# Patient Record
Sex: FEMALE | Race: WHITE | NOT HISPANIC OR LATINO | Employment: OTHER | URBAN - METROPOLITAN AREA
[De-identification: names, ages, dates, MRNs, and addresses within clinical notes are randomized per-mention and may not be internally consistent; named-entity substitution may affect disease eponyms.]

---

## 2017-12-21 ENCOUNTER — OFFICE VISIT - HEALTHEAST (OUTPATIENT)
Dept: FAMILY MEDICINE | Facility: CLINIC | Age: 62
End: 2017-12-21

## 2017-12-21 DIAGNOSIS — H10.9 CONJUNCTIVITIS: ICD-10-CM

## 2019-09-25 ENCOUNTER — COMMUNICATION - HEALTHEAST (OUTPATIENT)
Dept: FAMILY MEDICINE | Facility: CLINIC | Age: 64
End: 2019-09-25

## 2021-05-25 ENCOUNTER — RECORDS - HEALTHEAST (OUTPATIENT)
Dept: ADMINISTRATIVE | Facility: CLINIC | Age: 66
End: 2021-05-25

## 2021-05-26 ENCOUNTER — RECORDS - HEALTHEAST (OUTPATIENT)
Dept: ADMINISTRATIVE | Facility: CLINIC | Age: 66
End: 2021-05-26

## 2021-05-27 ENCOUNTER — RECORDS - HEALTHEAST (OUTPATIENT)
Dept: ADMINISTRATIVE | Facility: CLINIC | Age: 66
End: 2021-05-27

## 2021-05-28 ENCOUNTER — RECORDS - HEALTHEAST (OUTPATIENT)
Dept: ADMINISTRATIVE | Facility: CLINIC | Age: 66
End: 2021-05-28

## 2021-05-29 ENCOUNTER — RECORDS - HEALTHEAST (OUTPATIENT)
Dept: ADMINISTRATIVE | Facility: CLINIC | Age: 66
End: 2021-05-29

## 2021-05-30 ENCOUNTER — RECORDS - HEALTHEAST (OUTPATIENT)
Dept: ADMINISTRATIVE | Facility: CLINIC | Age: 66
End: 2021-05-30

## 2021-05-31 ENCOUNTER — RECORDS - HEALTHEAST (OUTPATIENT)
Dept: ADMINISTRATIVE | Facility: CLINIC | Age: 66
End: 2021-05-31

## 2021-05-31 VITALS — BODY MASS INDEX: 23.59 KG/M2 | WEIGHT: 129 LBS

## 2021-06-05 ENCOUNTER — RECORDS - HEALTHEAST (OUTPATIENT)
Dept: UROLOGY | Facility: CLINIC | Age: 66
End: 2021-06-05

## 2021-06-05 DIAGNOSIS — N20.0 CALCULUS OF KIDNEY: ICD-10-CM

## 2021-06-14 NOTE — PROGRESS NOTES
Assessment:      Acute conjunctivitis, right eye      Plan:      Discussed the diagnosis and proper care of conjunctivitis.  Stressed household hygiene.  Ophthalmic drops per orders.   Discussed signs of worsening infection and when to follow-up with PCP if no symptom improvement.    Patient Instructions   You were seen today for conjunctivitis.    Management:  - Apply antibiotic drops as prescribed until 24 hours of no symptoms  - Use warm compresses to clear discharge and crust  - Encourage good hand hygiene with frequent hand washing  - Avoid itching or rubbing the eye    Reasons to come back:  - If symptoms have not improved in 3-5 days  - Develop excessive pus-like discharge and/or can't keep eyes open  - Develop a fever, cough, ear pain, or shortness of breath         Subjective:      Dena Perales is a 62 y.o. female who presents for evaluation of eye redness. She has noticed the above symptoms in the right eye for 1 day. Onset was sudden. Symptoms have included blurred vision, discharge and itching. Patient denies foreign body sensation, photophobia and visual field deficit. There is no history of contact lens use or trauma.    The following portions of the patient's history were reviewed and updated as appropriate: allergies, current medications and problem list.    Review of Systems  Pertinent items are noted in HPI.     Allergies  No Known Allergies      Objective:      /74  Pulse 64  Temp 97.6  F (36.4  C) (Oral)   Resp 14  Wt 129 lb (58.5 kg)  SpO2 98%  BMI 23.59 kg/m2            General: alert, appears stated age, cooperative, no distress and non-toxic   Eyes:  right: conjunctivae erythematous, sclera injected; no discharge noted. Left: conjunctivae/sclera clear, no discharge. PERRL. EOM intact   Vision: Not performed   Fluorescein:  not done

## 2021-07-13 ENCOUNTER — RECORDS - HEALTHEAST (OUTPATIENT)
Dept: ADMINISTRATIVE | Facility: CLINIC | Age: 66
End: 2021-07-13

## 2021-07-21 ENCOUNTER — RECORDS - HEALTHEAST (OUTPATIENT)
Dept: ADMINISTRATIVE | Facility: CLINIC | Age: 66
End: 2021-07-21

## 2021-09-05 ENCOUNTER — HEALTH MAINTENANCE LETTER (OUTPATIENT)
Age: 66
End: 2021-09-05

## 2021-09-15 ENCOUNTER — APPOINTMENT (OUTPATIENT)
Dept: CT IMAGING | Facility: HOSPITAL | Age: 66
DRG: 394 | End: 2021-09-15
Attending: EMERGENCY MEDICINE
Payer: COMMERCIAL

## 2021-09-15 ENCOUNTER — HOSPITAL ENCOUNTER (INPATIENT)
Facility: HOSPITAL | Age: 66
LOS: 3 days | Discharge: HOME | DRG: 394 | End: 2021-09-19
Attending: EMERGENCY MEDICINE | Admitting: HOSPITALIST
Payer: COMMERCIAL

## 2021-09-15 DIAGNOSIS — K27.9 PEPTIC ULCER: ICD-10-CM

## 2021-09-15 DIAGNOSIS — R10.13 ABDOMINAL PAIN, EPIGASTRIC: ICD-10-CM

## 2021-09-15 DIAGNOSIS — R11.0 NAUSEA: ICD-10-CM

## 2021-09-15 DIAGNOSIS — K91.850 POUCHITIS (H): Primary | ICD-10-CM

## 2021-09-15 DIAGNOSIS — K29.71 GASTROINTESTINAL HEMORRHAGE ASSOCIATED WITH GASTRITIS, UNSPECIFIED GASTRITIS TYPE: ICD-10-CM

## 2021-09-15 LAB
ALBUMIN SERPL-MCNC: 3.6 G/DL (ref 3.5–5)
ALP SERPL-CCNC: 54 U/L (ref 45–120)
ALT SERPL W P-5'-P-CCNC: 14 U/L (ref 0–45)
ANION GAP SERPL CALCULATED.3IONS-SCNC: 13 MMOL/L (ref 5–18)
AST SERPL W P-5'-P-CCNC: 22 U/L (ref 0–40)
BILIRUB SERPL-MCNC: 0.8 MG/DL (ref 0–1)
BUN SERPL-MCNC: 8 MG/DL (ref 8–22)
CALCIUM SERPL-MCNC: 9.9 MG/DL (ref 8.5–10.5)
CHLORIDE BLD-SCNC: 92 MMOL/L (ref 98–107)
CO2 SERPL-SCNC: 31 MMOL/L (ref 22–31)
CREAT SERPL-MCNC: 0.7 MG/DL (ref 0.6–1.1)
ERYTHROCYTE [DISTWIDTH] IN BLOOD BY AUTOMATED COUNT: 13.6 % (ref 10–15)
GFR SERPL CREATININE-BSD FRML MDRD: >90 ML/MIN/1.73M2
GLUCOSE BLD-MCNC: 123 MG/DL (ref 70–125)
HCT VFR BLD AUTO: 42.7 % (ref 35–47)
HGB BLD-MCNC: 13.9 G/DL (ref 11.7–15.7)
LIPASE SERPL-CCNC: 23 U/L (ref 0–52)
MAGNESIUM SERPL-MCNC: 1.9 MG/DL (ref 1.8–2.6)
MCH RBC QN AUTO: 29.3 PG (ref 26.5–33)
MCHC RBC AUTO-ENTMCNC: 32.6 G/DL (ref 31.5–36.5)
MCV RBC AUTO: 90 FL (ref 78–100)
PLATELET # BLD AUTO: 328 10E3/UL (ref 150–450)
POTASSIUM BLD-SCNC: 4.2 MMOL/L (ref 3.5–5)
PROT SERPL-MCNC: 7.1 G/DL (ref 6–8)
RBC # BLD AUTO: 4.74 10E6/UL (ref 3.8–5.2)
SARS-COV-2 RNA RESP QL NAA+PROBE: NEGATIVE
SODIUM SERPL-SCNC: 136 MMOL/L (ref 136–145)
WBC # BLD AUTO: 9.8 10E3/UL (ref 4–11)

## 2021-09-15 PROCEDURE — 250N000011 HC RX IP 250 OP 636: Performed by: EMERGENCY MEDICINE

## 2021-09-15 PROCEDURE — C9113 INJ PANTOPRAZOLE SODIUM, VIA: HCPCS | Performed by: EMERGENCY MEDICINE

## 2021-09-15 PROCEDURE — 96375 TX/PRO/DX INJ NEW DRUG ADDON: CPT

## 2021-09-15 PROCEDURE — 96361 HYDRATE IV INFUSION ADD-ON: CPT

## 2021-09-15 PROCEDURE — 83690 ASSAY OF LIPASE: CPT | Performed by: EMERGENCY MEDICINE

## 2021-09-15 PROCEDURE — 258N000003 HC RX IP 258 OP 636: Performed by: EMERGENCY MEDICINE

## 2021-09-15 PROCEDURE — 83735 ASSAY OF MAGNESIUM: CPT | Performed by: EMERGENCY MEDICINE

## 2021-09-15 PROCEDURE — 87635 SARS-COV-2 COVID-19 AMP PRB: CPT | Performed by: EMERGENCY MEDICINE

## 2021-09-15 PROCEDURE — C9803 HOPD COVID-19 SPEC COLLECT: HCPCS

## 2021-09-15 PROCEDURE — 85027 COMPLETE CBC AUTOMATED: CPT | Performed by: EMERGENCY MEDICINE

## 2021-09-15 PROCEDURE — 74177 CT ABD & PELVIS W/CONTRAST: CPT

## 2021-09-15 PROCEDURE — 96365 THER/PROPH/DIAG IV INF INIT: CPT

## 2021-09-15 PROCEDURE — 99285 EMERGENCY DEPT VISIT HI MDM: CPT | Mod: 25

## 2021-09-15 PROCEDURE — 82247 BILIRUBIN TOTAL: CPT | Performed by: EMERGENCY MEDICINE

## 2021-09-15 PROCEDURE — 86141 C-REACTIVE PROTEIN HS: CPT | Performed by: INTERNAL MEDICINE

## 2021-09-15 PROCEDURE — 36415 COLL VENOUS BLD VENIPUNCTURE: CPT | Performed by: EMERGENCY MEDICINE

## 2021-09-15 RX ORDER — ONDANSETRON 2 MG/ML
4 INJECTION INTRAMUSCULAR; INTRAVENOUS ONCE
Status: COMPLETED | OUTPATIENT
Start: 2021-09-15 | End: 2021-09-15

## 2021-09-15 RX ORDER — BUPROPION HYDROCHLORIDE 150 MG/1
150 TABLET ORAL EVERY MORNING
COMMUNITY

## 2021-09-15 RX ORDER — MORPHINE SULFATE 2 MG/ML
2 INJECTION, SOLUTION INTRAMUSCULAR; INTRAVENOUS ONCE
Status: COMPLETED | OUTPATIENT
Start: 2021-09-15 | End: 2021-09-15

## 2021-09-15 RX ORDER — ONDANSETRON 8 MG/1
8 TABLET, FILM COATED ORAL EVERY 8 HOURS PRN
COMMUNITY

## 2021-09-15 RX ORDER — IOPAMIDOL 755 MG/ML
100 INJECTION, SOLUTION INTRAVASCULAR ONCE
Status: COMPLETED | OUTPATIENT
Start: 2021-09-15 | End: 2021-09-15

## 2021-09-15 RX ADMIN — PANTOPRAZOLE SODIUM 40 MG: 40 INJECTION, POWDER, FOR SOLUTION INTRAVENOUS at 19:29

## 2021-09-15 RX ADMIN — SODIUM CHLORIDE 1000 ML: 9 INJECTION, SOLUTION INTRAVENOUS at 18:08

## 2021-09-15 RX ADMIN — MORPHINE SULFATE 2 MG: 2 INJECTION, SOLUTION INTRAMUSCULAR; INTRAVENOUS at 19:31

## 2021-09-15 RX ADMIN — IOPAMIDOL 100 ML: 755 INJECTION, SOLUTION INTRAVENOUS at 19:49

## 2021-09-15 RX ADMIN — ONDANSETRON 4 MG: 2 INJECTION INTRAMUSCULAR; INTRAVENOUS at 18:09

## 2021-09-15 ASSESSMENT — ENCOUNTER SYMPTOMS
DYSURIA: 0
FEVER: 1
NAUSEA: 1
HEMATURIA: 0
ABDOMINAL PAIN: 1
DIFFICULTY URINATING: 0
FREQUENCY: 0
DIARRHEA: 0
SHORTNESS OF BREATH: 0
VOMITING: 0

## 2021-09-15 NOTE — ED TRIAGE NOTES
Patient arrives by private car for evaluation of abdominal pain that started Friday.  Patient was seen at [facility] and PCP and was then advised to come here.

## 2021-09-16 PROBLEM — K91.850 POUCHITIS (H): Status: ACTIVE | Noted: 2021-09-16

## 2021-09-16 PROBLEM — R11.0 NAUSEA: Status: ACTIVE | Noted: 2021-09-16

## 2021-09-16 PROBLEM — R10.13 ABDOMINAL PAIN, EPIGASTRIC: Status: ACTIVE | Noted: 2021-09-16

## 2021-09-16 PROBLEM — K29.71 GASTROINTESTINAL HEMORRHAGE ASSOCIATED WITH GASTRITIS, UNSPECIFIED GASTRITIS TYPE: Status: ACTIVE | Noted: 2021-09-16

## 2021-09-16 LAB
ATRIAL RATE - MUSE: 64 BPM
C REACTIVE PROTEIN LHE: 0.3 MG/DL (ref 0–0.8)
DIASTOLIC BLOOD PRESSURE - MUSE: 59 MMHG
ERYTHROCYTE [SEDIMENTATION RATE] IN BLOOD BY WESTERGREN METHOD: 10 MM/HR (ref 0–20)
HGB BLD-MCNC: 10.7 G/DL (ref 11.7–15.7)
HGB BLD-MCNC: 11.2 G/DL (ref 11.7–15.7)
HGB BLD-MCNC: 11.5 G/DL (ref 11.7–15.7)
HOLD SPECIMEN: NORMAL
INR PPP: 1.12 (ref 0.85–1.15)
INTERPRETATION ECG - MUSE: NORMAL
LACTATE SERPL-SCNC: 0.6 MMOL/L (ref 0.7–2)
P AXIS - MUSE: 77 DEGREES
PR INTERVAL - MUSE: 162 MS
PROCALCITONIN SERPL-MCNC: <0.02 NG/ML (ref 0–0.49)
QRS DURATION - MUSE: 82 MS
QT - MUSE: 448 MS
QTC - MUSE: 462 MS
R AXIS - MUSE: 49 DEGREES
SYSTOLIC BLOOD PRESSURE - MUSE: 122 MMHG
T AXIS - MUSE: 73 DEGREES
VENTRICULAR RATE- MUSE: 64 BPM

## 2021-09-16 PROCEDURE — 250N000011 HC RX IP 250 OP 636: Performed by: INTERNAL MEDICINE

## 2021-09-16 PROCEDURE — 36415 COLL VENOUS BLD VENIPUNCTURE: CPT | Performed by: INTERNAL MEDICINE

## 2021-09-16 PROCEDURE — 99223 1ST HOSP IP/OBS HIGH 75: CPT | Performed by: HOSPITALIST

## 2021-09-16 PROCEDURE — 83605 ASSAY OF LACTIC ACID: CPT | Performed by: INTERNAL MEDICINE

## 2021-09-16 PROCEDURE — 85018 HEMOGLOBIN: CPT | Performed by: INTERNAL MEDICINE

## 2021-09-16 PROCEDURE — C9113 INJ PANTOPRAZOLE SODIUM, VIA: HCPCS | Performed by: INTERNAL MEDICINE

## 2021-09-16 PROCEDURE — 93005 ELECTROCARDIOGRAM TRACING: CPT | Performed by: INTERNAL MEDICINE

## 2021-09-16 PROCEDURE — 120N000001 HC R&B MED SURG/OB

## 2021-09-16 PROCEDURE — 84145 PROCALCITONIN (PCT): CPT | Performed by: INTERNAL MEDICINE

## 2021-09-16 PROCEDURE — 258N000003 HC RX IP 258 OP 636: Performed by: INTERNAL MEDICINE

## 2021-09-16 PROCEDURE — 85652 RBC SED RATE AUTOMATED: CPT | Performed by: INTERNAL MEDICINE

## 2021-09-16 PROCEDURE — 85610 PROTHROMBIN TIME: CPT | Performed by: INTERNAL MEDICINE

## 2021-09-16 PROCEDURE — 87040 BLOOD CULTURE FOR BACTERIA: CPT | Performed by: INTERNAL MEDICINE

## 2021-09-16 PROCEDURE — 250N000013 HC RX MED GY IP 250 OP 250 PS 637: Performed by: INTERNAL MEDICINE

## 2021-09-16 RX ORDER — LIDOCAINE 40 MG/G
CREAM TOPICAL
Status: CANCELLED | OUTPATIENT
Start: 2021-09-16

## 2021-09-16 RX ORDER — TRAZODONE HYDROCHLORIDE 50 MG/1
50-100 TABLET, FILM COATED ORAL
Status: DISCONTINUED | OUTPATIENT
Start: 2021-09-16 | End: 2021-09-19 | Stop reason: HOSPADM

## 2021-09-16 RX ORDER — ONDANSETRON 2 MG/ML
4 INJECTION INTRAMUSCULAR; INTRAVENOUS EVERY 6 HOURS PRN
Status: DISCONTINUED | OUTPATIENT
Start: 2021-09-16 | End: 2021-09-19 | Stop reason: HOSPADM

## 2021-09-16 RX ORDER — ONDANSETRON 4 MG/1
4 TABLET, ORALLY DISINTEGRATING ORAL EVERY 6 HOURS PRN
Status: DISCONTINUED | OUTPATIENT
Start: 2021-09-16 | End: 2021-09-19 | Stop reason: HOSPADM

## 2021-09-16 RX ORDER — LIDOCAINE 40 MG/G
CREAM TOPICAL
Status: DISCONTINUED | OUTPATIENT
Start: 2021-09-16 | End: 2021-09-19 | Stop reason: HOSPADM

## 2021-09-16 RX ORDER — BUPROPION HYDROCHLORIDE 150 MG/1
150 TABLET ORAL EVERY MORNING
Status: DISCONTINUED | OUTPATIENT
Start: 2021-09-16 | End: 2021-09-19 | Stop reason: HOSPADM

## 2021-09-16 RX ORDER — CIPROFLOXACIN 2 MG/ML
400 INJECTION, SOLUTION INTRAVENOUS EVERY 12 HOURS
Status: DISCONTINUED | OUTPATIENT
Start: 2021-09-16 | End: 2021-09-18

## 2021-09-16 RX ORDER — MAGNESIUM SULFATE HEPTAHYDRATE 40 MG/ML
2 INJECTION, SOLUTION INTRAVENOUS ONCE
Status: COMPLETED | OUTPATIENT
Start: 2021-09-16 | End: 2021-09-16

## 2021-09-16 RX ORDER — SODIUM CHLORIDE 9 MG/ML
INJECTION, SOLUTION INTRAVENOUS CONTINUOUS
Status: DISCONTINUED | OUTPATIENT
Start: 2021-09-16 | End: 2021-09-19 | Stop reason: HOSPADM

## 2021-09-16 RX ORDER — LAMOTRIGINE 150 MG/1
150 TABLET ORAL DAILY
Status: DISCONTINUED | OUTPATIENT
Start: 2021-09-16 | End: 2021-09-19 | Stop reason: HOSPADM

## 2021-09-16 RX ADMIN — BUPROPION HYDROCHLORIDE 150 MG: 150 TABLET, EXTENDED RELEASE ORAL at 07:58

## 2021-09-16 RX ADMIN — CIPROFLOXACIN 400 MG: 2 INJECTION, SOLUTION INTRAVENOUS at 15:01

## 2021-09-16 RX ADMIN — SERTRALINE HYDROCHLORIDE 100 MG: 50 TABLET ORAL at 07:58

## 2021-09-16 RX ADMIN — MAGNESIUM SULFATE HEPTAHYDRATE 2 G: 40 INJECTION, SOLUTION INTRAVENOUS at 05:38

## 2021-09-16 RX ADMIN — PANTOPRAZOLE SODIUM 40 MG: 40 INJECTION, POWDER, FOR SOLUTION INTRAVENOUS at 20:42

## 2021-09-16 RX ADMIN — LAMOTRIGINE 150 MG: 150 TABLET ORAL at 07:58

## 2021-09-16 RX ADMIN — SODIUM CHLORIDE, PRESERVATIVE FREE: 5 INJECTION INTRAVENOUS at 14:54

## 2021-09-16 RX ADMIN — SODIUM CHLORIDE, PRESERVATIVE FREE: 5 INJECTION INTRAVENOUS at 05:37

## 2021-09-16 RX ADMIN — CIPROFLOXACIN 400 MG: 2 INJECTION, SOLUTION INTRAVENOUS at 02:49

## 2021-09-16 RX ADMIN — PANTOPRAZOLE SODIUM 40 MG: 40 INJECTION, POWDER, FOR SOLUTION INTRAVENOUS at 08:01

## 2021-09-16 ASSESSMENT — ACTIVITIES OF DAILY LIVING (ADL): DEPENDENT_IADLS:: INDEPENDENT

## 2021-09-16 ASSESSMENT — MIFFLIN-ST. JEOR: SCORE: 1052.1

## 2021-09-16 NOTE — CONSULTS
Care Management Initial Consult    General Information  Assessment completed with: Patient,  (patient)  Type of CM/SW Visit: Initial Assessment    Primary Care Provider verified and updated as needed: Yes   Readmission within the last 30 days: no previous admission in last 30 days      Reason for Consult: discharge planning  Advance Care Planning: Advance Care Planning Reviewed: concerns discussed          Communication Assessment  Patient's communication style: spoken language (English or Bilingual)    Hearing Difficulty or Deaf: no   Wear Glasses or Blind: yes    Cognitive  Cognitive/Neuro/Behavioral: WDL                      Living Environment:   People in home: child(dimitri), adult     Current living Arrangements: house      Able to return to prior arrangements: yes       Family/Social Support:  Care provided by:    Provides care for: child(dimitri)                Description of Support System:           Current Resources:   Patient receiving home care services: No     Community Resources: None  Equipment currently used at home: none  Supplies currently used at home: None    Employment/Financial:  Employment Status:          Financial Concerns:             Lifestyle & Psychosocial Needs:  Social Determinants of Health     Tobacco Use: Low Risk      Smoking Tobacco Use: Never Smoker     Smokeless Tobacco Use: Never Used   Alcohol Use:      Frequency of Alcohol Consumption:      Average Number of Drinks:      Frequency of Binge Drinking:    Financial Resource Strain:      Difficulty of Paying Living Expenses:    Food Insecurity:      Worried About Running Out of Food in the Last Year:      Ran Out of Food in the Last Year:    Transportation Needs:      Lack of Transportation (Medical):      Lack of Transportation (Non-Medical):    Physical Activity:      Days of Exercise per Week:      Minutes of Exercise per Session:    Stress:      Feeling of Stress :    Social Connections:      Frequency of Communication with Friends  and Family:      Frequency of Social Gatherings with Friends and Family:      Attends [employer] Services:      Active Member of Clubs or Organizations:      Attends Club or Organization Meetings:      Marital Status:    Intimate Partner Violence:      Fear of Current or Ex-Partner:      Emotionally Abused:      Physically Abused:      Sexually Abused:    Depression:      PHQ-2 Score:    Housing Stability:      Unable to Pay for Housing in the Last Year:      Number of Places Lived in the Last Year:      Unstable Housing in the Last Year:        Functional Status:  Prior to admission patient needed assistance:   Dependent ADLs:: Independent  Dependent IADLs:: Independent  Assesssment of Functional Status: At functional baseline    Mental Health Status:          Chemical Dependency Status:                Values/Beliefs:  Spiritual, Cultural Beliefs, Ep[employer]opalian Practices, Values that affect care:                 Additional Information:  AIDET completed. Writer met with Pt.  Pt lives with daughter Lena: [phone] in a private residence.  Patient crying, very emotional when writer arrived. She had talked with hospitalist recently and agreed to DNR/DNI code status change. After hospitliast left she said she has felt unsure, she was very emotional.     At this time Pt wants to change status back to full code. Writer reviewed with patient the potential outcomes of full code status, CPR, that there is no guarantee of good outcome. AT the end of conversation, determined patient not ready to make the decision yet to change to DNR status. She seemed relived when writer acknowledged this. Encouraged her to talk with her daughter more. Call to hospitalist who spoke with patient. She will change status back to full code.     Patient otherwise independent in home, no DME, or services.     Family to transport at discharge. Home without needs most likely, Informed CM to follow.     Massiel Rosas, SORIN, CM, DANIEL

## 2021-09-16 NOTE — PLAN OF CARE
Pt is not appropriate for skilled OT/PT services at this time. Pt declining to participate in evaluations and reports she is independent and at baseline with ADL/IADLs as well as funtional mobility and endurance. Will defer to RN for daily cares.  Plan was discussed with PT and RN.  Will D/C current orders. Please re-order if Pt's status changes. Thank you!    9/16/2021 by Melissa Reyes OTR/JAVON

## 2021-09-16 NOTE — ED PROVIDER NOTES
EMERGENCY DEPARTMENT ENCOUNTER      NAME: Dena Perales  YOB: 1955  MRN: [id number]      FINAL IMPRESSION  1. Pouchitis (H)    2. Gastrointestinal hemorrhage associated with gastritis, unspecified gastritis type    3. Abdominal pain, epigastric    4. Nausea        MEDICAL DECISION MAKING   Pertinent Labs & Imaging studies reviewed. (See chart for details)    Dena Perales is a 66-year-old female presents for evaluation of abdominal pain, nausea, decreased p.o. intake.  Patient was seen by her primary care provider who subsequently referred her to urgency room where she was seen 3 days ago.  She had a CT scan that showed intrarenal stone  But no other acute findings.  Labs are reassuring.  She was seen in follow-up 2 days later by PCP prescribed Zofran but apparently, raise some concern for dehydration and advised to be seen here in the emergency department.  Patient endorses intermittent generalized abdominal pain and nausea as well as loss of appetite.  She states that she has not eaten or drank much since her symptoms began 5 days ago.  Additionally, she reports having 2 episodes of black tarry stool last night.  She has a complicated history including gastric bypass, peptic ulcer, ureteral stone, cholecystectomy and appendectomy.  She is not certain if her current symptoms feel similar to what she experienced with past ulcers.    I considered a broad differential including but not limited to PUD, esophagitis, GERD, hepatobiliary disease, Margarita-Wade tear, varices, diverticulosis, AVM, colitis, ischemia, anemia, electrolyte derangement, INESSA, coagulopathy. Patient denies frequent NSAID use. She is not on a blood thinner.  Labs were ordered while patient was awaiting room in triage and were reassuring.  We have agreed on plan to proceed with CT abdomen/pelvis.  Will manage symptoms with IV Protonix and IV analgesic/antiemetic.    CT revealed evidence of pouchitis/gastritis/enteritis and I  suspect this to be etiology of symptoms.  Labs were unremarkable including stable hemoglobin and leukocytosis.  I rechecked the patient and reviewed these results.  She did have improvement in discomfort after initial interventions.  We discussed options for further work-up and management of symptoms.  Given her complicated gastric history and evidence of inflammation of pouch possibly, GI bleed/peptic ulcer disease, we have agreed on plan for admission.     I discussed the case with hospitalist at [facility] who did not feel patient would be appropriate for their facility given limited specialty services and potentially, need for evaluation by GI and/or colorectal surgery.  Unfortunately, there are no beds available here at [facility] at this time so patient will need to board in the hospital overnight.     Discussed the case with Dr. Espinoza of [facility] medicine team who agreed to facilitate admission to inpatient. COVID was ordered for screening/hospitalization purposes and is pending. She was signed out to night ED provider in stable condition. No acute events under my care.         ED COURSE  7:11 PM Introduced myself to the patient, obtained the history of present illness, and performed initial physical exam at this time.   8:35 PM Updated the patient and discussed disposition plan. The patient would like to be admitted.  10:49 PM Spoke with Dr. Guzman, Hospitalist, from [facility] who states that the patient would not be a good fit for their hospital.  12:40 AM Patient ambulated to bathroom. She is comfortable.   12:55 AM Discussed the case with Dr. Espinoza who will admit the patient.   1:15 AM Signed out to night provider.     PPE: Provider wore gloves, N95 mask, eye protection, and paper mask.   MEDICATIONS GIVEN IN THE ED  Medications   0.9% sodium chloride BOLUS (0 mLs Intravenous Stopped 9/15/21 2118)   ondansetron (ZOFRAN) injection 4 mg (4 mg Intravenous Given 9/15/21 1809)   pantoprazole (PROTONIX) IV  push injection 40 mg (40 mg Intravenous Given 9/15/21 1929)   morphine (PF) injection 2 mg (2 mg Intravenous Given 9/15/21 1931)   iopamidol (ISOVUE-370) solution 100 mL (100 mLs Intravenous Given 9/15/21 1949)       NEW PRESCRIPTIONS STARTED AT TODAY'S VISIT  New Prescriptions    No medications on file          =================================================================    Chief Complaint   Patient presents with     Abdominal Pain         HPI:    Patient information was obtained from: the patient    Use of : N/A    Dena Perales is a 66 year old female with a pertinent medical history of s/p cholecystectomy, s/p appendectomy, s/p gastric bypass, peptic ulcers, and ureteral stone, who presents for evaluation of abdominal pain.    Per chart review, the patient was seen on 9/12/21 at [facility] for evaluation of abdominal pain. During this visit, the patient had a CT scan that showed intrarenal stones. Her basic labs were unremarkable. The patient was discharged and told to follow up with her primary care physician if she had any new or worsening symptoms.    On 9/14/21, the patient was seen at [facility] by her primary care provider for evaluation of abdominal pain. During this visit the patient was prescribed Zofran and instructed to push fluids. At the end of the visit, the patient was instructed to follow up with worsening symptoms in the event that she need more urgent evaluation.    On Friday (five days ago), the patient began experiencing intermittent generalized abdominal pain that is described as if someone had kicked her in the stomach repeatedly. She endorses intermittent nausea that makes it difficult for her to eat or drink anything. She denies any association of the pain with eating, as she hasn't eaten anything in a significant period of time. Thought the patient has a history of ulcers, she is unsure if this pain is similar. She reports having  two episodes of black and tarry stool last night. She has never had black or tarry stool before, so this was concerning to her. While she endorses subjective fevers, she has not measured any of them. The patient denies chest pain, shortness of breath, urinary problems, diarrhea, vomiting, and any other symptoms at this time.     Of note, the patient drinks alcohol on occasion. She is not anticoagulated.    RELEVANT HISTORY, MEDICATIONS, & ALLERGIES   Past medical history, surgical history, family history, medications, and allergies reviewed and pertinent noted in HPI. See end of note for comprehensive list.    REVIEW OF SYSTEMS:  Review of Systems   Constitutional: Positive for fever.   Respiratory: Negative for shortness of breath.    Cardiovascular: Negative for chest pain.   Gastrointestinal: Positive for abdominal pain and nausea. Negative for diarrhea and vomiting.        Positive for black and tarry stool   Genitourinary: Negative for difficulty urinating, dysuria, frequency and hematuria.   All other systems reviewed and are negative.      PHYSICAL EXAM:    Vitals: /59   Pulse 68   Temp 99.2  F (37.3  C)   Resp 18   Wt 54.4 kg (120 lb)   SpO2 94%   BMI 21.95 kg/m     General: Alert and interactive, comfortable appearing.  HENT: Oropharynx without erythema or exudates. MMM.   Eyes: Pupils mid-sized and equally reactive.   Neck: Full AROM.   Cardiovascular: Regular rate and rhythm. Peripheral pulses 2+ bilaterally.  Chest/Pulmonary: Normal work of breathing. Lung sounds clear and equal throughout, no wheezes or crackles. No chest wall tenderness or deformities.  Abdomen: Soft, nondistended. Very mild TTP diffusely, without guarding or rebound.  Back/Spine: No CVA or midline tenderness.  Extremities: Normal ROM of all major joints. No lower extremity edema.   Skin: Warm and dry. Normal skin color.   Neuro: Speech clear. CNs grossly intact. Moves all extremities appropriately. Strength and sensation  grossly intact to all extremities.   Psych: Normal affect/mood, cooperative, memory appropriate.     LAB  Labs Ordered and Resulted from Time of ED Arrival Up to the Time of Departure from the ED   COMPREHENSIVE METABOLIC PANEL - Abnormal; Notable for the following components:       Result Value    Chloride 92 (*)     All other components within normal limits   LIPASE - Normal   MAGNESIUM - Normal   CBC WITH PLATELETS - Normal   COVID-19 VIRUS (CORONAVIRUS) BY PCR - Normal    Narrative:     Testing was performed using the [employer]  SARS-CoV-2 & Influenza A/B Assay on the [employer]  Brittni  System.  This test should be ordered for the detection of SARS-COV-2 in individuals who meet SARS-CoV-2 clinical and/or epidemiological criteria. Test performance is unknown in asymptomatic patients.  This test is for in vitro diagnostic use under the FDA EUA for laboratories certified under CLIA to perform moderate and/or high complexity testing. This test has not been FDA cleared or approved.  A negative test does not rule out the presence of PCR inhibitors in the specimen or target RNA in concentration below the limit of detection for the assay. The possibility of a false negative should be considered if the patient's recent exposure or clinical presentation suggests COVID-19.  [facility] are certified under the Clinical Laboratory Improvement Amendments of 1988 (CLIA-88) as qualified to perform moderate and/or high complexity laboratory testing.   PERIPHERAL IV CATHETER       RADIOLOGY  CT Abdomen Pelvis w Contrast   Final Result   IMPRESSION:    1.  There now appears to be inflammatory wall thickening and slight surrounding inflammation involving the gastric pouch and proximal Julianne loop suggesting gastritis/enteritis. No definitive ulcer or perforation identified. Remainder of bowel    unremarkable.          Comprehensive outline of EPIC chart Hx  PAST MEDICAL HISTORY    No past medical history on file.  Past  Surgical History:   Procedure Laterality Date     GASTRIC BYPASS       HC REDUCTION OF LARGE BREAST      Description: Breast Surgery Reduction Procedure;  Recorded: 08/19/2013;     HC REMOVAL GALLBLADDER      Description: Cholecystectomy;  Recorded: 08/19/2013;     HC REPAIR INTERCARP/CARP-METACARP JT Left 6/23/2015    Procedure: LEFT CARPOMETACARPAL ARTHROPLASTY;  Surgeon: Omega Covington MD;  Location: [place] [place] [facility];  Service: Hand     OTHER SURGICAL HISTORY      tummy tuck     MA ERCP W/BIOPSY SINGLE/MULTIPLE         CURRENT MEDICATIONS    No current facility-administered medications for this encounter.    Current Outpatient Medications:      buPROPion (WELLBUTRIN XL) 150 MG 24 hr tablet, Take 150 mg by mouth every morning, Disp: , Rfl:      ergocalciferol (VITAMIN D2) 50,000 unit capsule, Take 50,000 Units by mouth once a week , Disp: , Rfl:      ibuprofen (ADVIL/MOTRIN) 200 MG tablet, Take 200-400 mg by mouth 2 times daily as needed , Disp: , Rfl:      lamoTRIgine (LAMICTAL) 150 MG tablet, [LAMOTRIGINE (LAMICTAL) 150 MG TABLET] Take 150 mg by mouth daily., Disp: , Rfl:      omeprazole (PRILOSEC) 20 MG capsule, [OMEPRAZOLE (PRILOSEC) 20 MG CAPSULE] Take 20 mg by mouth daily. Take prn., Disp: , Rfl:      ondansetron (ZOFRAN) 8 MG tablet, Take 8 mg by mouth every 8 hours as needed for nausea, Disp: , Rfl:      propranolol (INDERAL) 20 MG tablet, Take 20 mg by mouth 3 times daily as needed , Disp: , Rfl:      sertraline (ZOLOFT) 100 MG tablet, [SERTRALINE (ZOLOFT) 100 MG TABLET] Take 100 mg by mouth daily., Disp: , Rfl:      traZODone (DESYREL) 50 MG tablet, Take  mg by mouth nightly as needed , Disp: , Rfl:     ALLERGIES    No Known Allergies    FAMILY HISTORY    Family History   Problem Relation Age of Onset     Gout Mother      Osteoporosis Mother      Kidney Disease Mother      Breast Cancer Mother      Gout Father      Crohn's Disease Father      Osteoporosis Father      Kidney Disease Father       Diabetes Father      Urolithiasis Sister         recurrent     Heart Disease Sister         coronary artery disease     Hypertension Sister      Kidney Disease Sister      Breast Cancer Sister      Kidney Disease Brother      Urolithiasis Paternal Grandmother         single stone     Gout Paternal Grandmother      Breast Cancer Paternal Grandmother      Urolithiasis Paternal Grandfather         recurrent      Heart Disease Paternal Grandfather      Heart Disease Maternal Grandmother      Diabetes Maternal Grandmother      Cerebrovascular Disease Maternal Grandmother      Colon Cancer Maternal Grandfather        SOCIAL HISTORY    Social History     Socioeconomic History     Marital status: Single     Spouse name: Not on file     Number of children: Not on file     Years of education: Not on file     Highest education level: Not on file   Occupational History     Not on file   Tobacco Use     Smoking status: Never Smoker     Smokeless tobacco: Never Used   Substance and Sexual Activity     Alcohol use: Yes     Alcohol/week: 0.0 standard drinks     Drug use: No     Sexual activity: Not Currently   Other Topics Concern     Not on file   Social History Narrative     Not on file     Social Determinants of Health     Financial Resource Strain:      Difficulty of Paying Living Expenses:    Food Insecurity:      Worried About Running Out of Food in the Last Year:      Ran Out of Food in the Last Year:    Transportation Needs:      Lack of Transportation (Medical):      Lack of Transportation (Non-Medical):    Physical Activity:      Days of Exercise per Week:      Minutes of Exercise per Session:    Stress:      Feeling of Stress :    Social Connections:      Frequency of Communication with Friends and Family:      Frequency of Social Gatherings with Friends and Family:      Attends Yarsani Services:      Active Member of Clubs or Organizations:      Attends Club or Organization Meetings:      Marital Status:    Intimate  Partner Violence:      Fear of Current or Ex-Partner:      Emotionally Abused:      Physically Abused:      Sexually Abused:        I, Anali Donaldson, am serving as a scribe to document services personally performed by Dr. Seble Johnson based on my observation and the provider's statements to me. I, Seble Johnson MD attest that Anali Donaldson is acting in a scribe capacity, has observed my performance of the services and has documented them in accordance with my direction.    Seble Johnson M.D.  Emergency Medicine  [facility] EMERGENCY DEPARTMENT  [address] [place] [id number]  [phone]  Dept: [phone]     Seble Johnson MD  09/16/21 0056

## 2021-09-16 NOTE — H&P
.  [facility]    History and Physical - Hospitalist Service       Date of Admission:  9/15/2021    Assessment & Plan      Dena Perales is a 66 year old female with history of multiple abdominal surgeries presented today with abdominal pain and nausea. CT abdomen concerned for inflammation involving the gastric pouch and proximal Julianne loop suggesting gastritis/enteritis.    Abdominal pain and nausea  -Patient has history of multiple abdominal surgeries comes in with abdominal pain of 5 days duration.  CT abdomen concern for inflammation involving the gastric pouch and proximal Julianne loop suggesting gastritis/enteritis.  -Started on Cipro, continue for now  -Zofran prn for N/V  -GI consulted, awaiting further input      GI bleed-likely upper  -Globin on admission was 13.9 which trended down to 11.5.  -Monitor hemoglobin every 6 hours  -Continue PPI twice daily  -GI consulted, pending evaluation  -Keep n.p.o., IVF    Mood disorder-Stable  -Continue PTA Wellbutrin, Lamictal, Zoloft, trazodone as needed.       Diet: NPO for Medical/Clinical Reasons Except for: Meds, Ice Chips    DVT Prophylaxis: Pneumatic Compression Devices  Franklin Catheter: Not present  Central Lines: None  Code Status: Full Code      Disposition Plan   Expected discharge:1-2 midnight for evaluation of abdominal pain and GI bleed.     The patient's care was discussed with the Bedside Nurse and Patient.    Ly Pacheco MD  [facility]  ______________________________________________________________________    Chief Complaint    Abdominal pain, nausea, decreased p.o. intake    History is obtained from the patient    History of Present Illness   Dena Perales is a 66 year old female with past medical history of multiple abdominal surgeries, GERD/PUD, mood disorder presented today for evaluation of abdominal pain, nausea and decreased p.o. intake.  Patient was seen by her primary care last week for the  similar complaints and was sent to urgent care for further evaluation with a CT scan.  CT abdomen showed an intrarenal stone but no other acute findings.  Patient went back to see her primary care as a follow-up visit on 9/15.  Patient continued to have symptoms of abdominal pain, nausea and reduced p.o. intake and PCP was concerned for mild dehydration and was sent in for further evaluation to [facility].  Patient continues to report generalized abdominal pain, nausea as well as decreased appetite.  Patient states she has not had anything to eat or drink since her onset of symptoms 5 days ago.  Reports having many nausea but no emesis.  No fever, cough, chest pain, shortness of breath, weakness, tingling, numbness, diarrhea, constipation, dysuria polyuria.  Patient reports having 2 episodes of dark tarry stools 2 days ago.  Patient is not on any chronic NSAIDs or iron supplements.  Patient has had multiple abdominal surgeries including the gastric bypass, ureteral stone, appendectomy and cholecystectomy.    In ER, patient was found to be hemodynamically stable.  Admission labs are unremarkable.  CT abdomen done showed inflammation involving the gastric pouch and proximal Julianne loop suggesting gastritis/enteritis and concerns for pouchitis.  Patient admitted for further evaluation.    Review of Systems    The 10 point Review of Systems is negative other than noted in the HPI or here.     Past Medical History    GERD/PUD, Mood disorder    Past Surgical History   I have reviewed this patient's surgical history and updated it with pertinent information if needed.  Past Surgical History:   Procedure Laterality Date     GASTRIC BYPASS       HC REDUCTION OF LARGE BREAST      Description: Breast Surgery Reduction Procedure;  Recorded: 08/19/2013;     HC REMOVAL GALLBLADDER      Description: Cholecystectomy;  Recorded: 08/19/2013;     HC REPAIR INTERCARP/CARP-METACARP JT Left 6/23/2015    Procedure: LEFT CARPOMETACARPAL  ARTHROPLASTY;  Surgeon: Omega Covington MD;  Location: [facility];  Service: Hand     OTHER SURGICAL HISTORY      tummy tuck     OR ERCP W/BIOPSY SINGLE/MULTIPLE         Social History   I have reviewed this patient's social history and updated it with pertinent information if needed.  Social History     Tobacco Use     Smoking status: Never Smoker     Smokeless tobacco: Never Used   Substance Use Topics     Alcohol use: Yes     Alcohol/week: 0.0 standard drinks     Drug use: No       Family History   I have reviewed this patient's family history and updated it with pertinent information if needed.  Family History   Problem Relation Age of Onset     Gout Mother      Osteoporosis Mother      Kidney Disease Mother      Breast Cancer Mother      Gout Father      Crohn's Disease Father      Osteoporosis Father      Kidney Disease Father      Diabetes Father      Urolithiasis Sister         recurrent     Heart Disease Sister         coronary artery disease     Hypertension Sister      Kidney Disease Sister      Breast Cancer Sister      Kidney Disease Brother      Urolithiasis Paternal Grandmother         single stone     Gout Paternal Grandmother      Breast Cancer Paternal Grandmother      Urolithiasis Paternal Grandfather         recurrent      Heart Disease Paternal Grandfather      Heart Disease Maternal Grandmother      Diabetes Maternal Grandmother      Cerebrovascular Disease Maternal Grandmother      Colon Cancer Maternal Grandfather        Prior to Admission Medications   Prior to Admission Medications   Prescriptions Last Dose Informant Patient Reported? Taking?   buPROPion (WELLBUTRIN XL) 150 MG 24 hr tablet 9/9/2021 at Unknown time  Yes Yes   Sig: Take 150 mg by mouth every morning   ergocalciferol (VITAMIN D2) 50,000 unit capsule More than a month at Unknown time  Yes Yes   Sig: Take 50,000 Units by mouth once a week    ibuprofen (ADVIL/MOTRIN) 200 MG tablet More than a month at Unknown time  Yes Yes    Sig: Take 200-400 mg by mouth 2 times daily as needed    lamoTRIgine (LAMICTAL) 150 MG tablet 9/9/2021 at Unknown time  Yes Yes   Sig: [LAMOTRIGINE (LAMICTAL) 150 MG TABLET] Take 150 mg by mouth daily.   omeprazole (PRILOSEC) 20 MG capsule 9/9/2021 at Unknown time  Yes Yes   Sig: [OMEPRAZOLE (PRILOSEC) 20 MG CAPSULE] Take 20 mg by mouth daily. Take prn.   ondansetron (ZOFRAN) 8 MG tablet 9/15/2021 at 1100  Yes Yes   Sig: Take 8 mg by mouth every 8 hours as needed for nausea   propranolol (INDERAL) 20 MG tablet 9/15/2021 at am  Yes Yes   Sig: Take 20 mg by mouth 3 times daily as needed    sertraline (ZOLOFT) 100 MG tablet 9/9/2021 at Unknown time  Yes Yes   Sig: [SERTRALINE (ZOLOFT) 100 MG TABLET] Take 100 mg by mouth daily.   traZODone (DESYREL) 50 MG tablet Past Month at Unknown time  Yes Yes   Sig: Take  mg by mouth nightly as needed       Facility-Administered Medications: None     Allergies   No Known Allergies    Physical Exam   Vital Signs: Temp: 99.2  F (37.3  C)   BP: 105/55 Pulse: 77   Resp: 24 SpO2: 97 %      Weight: 120 lbs 0 oz  General:  Appears stated age, no acute distress. A&O x 3.  Skin:  Warm, dry. No rashes or lesions on exposed skin.  HEENT:  Normocephalic, atraumatic; EOMs grossly intact.  Neck:  Supple.  Chest:  Breath sounds CTA and no increased work of breathing on room air.  Cardiovascular:  RRR, No peripheral edema.  Abdomen:  Soft, mild generalized tenderness, non distended.  Musculoskeletal:  Moves all four extremities. No muscle atrophy.  Neurological:  CN 2-12 grossly intact.    Data   Data reviewed today: I reviewed all medications, new labs and imaging results over the last 24 hours.  I personally reviewed labs, CT abdomen    Recent Labs   Lab 09/16/21  0831 09/15/21  1804   WBC  --  9.8   HGB 11.5* 13.9   MCV  --  90   PLT  --  328   INR 1.12  --    NA  --  136   POTASSIUM  --  4.2   CHLORIDE  --  92*   CO2  --  31   BUN  --  8   CR  --  0.70   ANIONGAP  --  13   DIANNA  --  9.9    GLC  --  123   ALBUMIN  --  3.6   PROTTOTAL  --  7.1   BILITOTAL  --  0.8   ALKPHOS  --  54   ALT  --  14   AST  --  22   LIPASE  --  23       Imaging:  Recent Results (from the past 24 hour(s))   CT Abdomen Pelvis w Contrast    Narrative    EXAM: CT ABDOMEN PELVIS W CONTRAST  LOCATION: [facility]  DATE/TIME: 9/15/2021 7:35 PM    INDICATION: Epigastric pain Nausea/vomiting  COMPARISON: 9/12/2021  TECHNIQUE: CT scan of the abdomen and pelvis was performed following injection of IV contrast. Multiplanar reformats were obtained. Dose reduction techniques were used.  CONTRAST: isovue 370 100ml    FINDINGS:   LOWER CHEST: Normal.    HEPATOBILIARY: Prior cholecystectomy.    PANCREAS: Normal.    SPLEEN: Normal.    ADRENAL GLANDS: Normal.    KIDNEYS/BLADDER: There are likely a few small stones within each kidney which appear unchanged, no hydronephrosis.    BOWEL: Patient appears of undergone a previous Julianne-en-Y gastric bypass procedure. There is an inflamed thick-walled appearance of the gastric remnant and likely a portion of the julianne loop. Mild amount of soft tissue stranding within the fat surrounds   gastric remnant and anastomosis. No definite perforation or ulceration. No free fluid or free air. The partitioned stomach remains normal. Remainder of small bowel and colon unremarkable.    LYMPH NODES: Normal.    VASCULATURE: Unremarkable.    PELVIC ORGANS: Probable small fibroid. No adnexal lesions. No free fluid.    MUSCULOSKELETAL: Unremarkable.      Impression    IMPRESSION:   1.  There now appears to be inflammatory wall thickening and slight surrounding inflammation involving the gastric pouch and proximal Julianne loop suggesting gastritis/enteritis. No definitive ulcer or perforation identified. Remainder of bowel   unremarkable.

## 2021-09-16 NOTE — ED NOTES
Patient reports improved pain, rates pain 2/10.  Patient up and walking to restroom, steady on feet.

## 2021-09-16 NOTE — UTILIZATION REVIEW
Admission Status; Secondary Review Determination   Under the authority of the Utilization Management Committee, the utilization review process indicated a secondary review on Dena Perales. The review outcome is based on review of the medical records, discussions with staff, and applying clinical experience noted on the date of the review.   (x) Inpatient Status Appropriate - This patient's medical care is consistent with medical management for inpatient care and reasonable inpatient medical practice.     RATIONALE FOR DETERMINATION   66 year old female with history of multiple abdominal surgeries presented to ER with abdominal pain and nausea. CT abdomen concerned for inflammation involving the gastric pouch and proximal Julianne loop suggesting gastritis/enteritis. NPO, on IV antibiotics , pending GI consult     At the time of admission with the information available to the attending physician more than 2 nights Hospital complex care was anticipated, based on patient risk of adverse outcome if treated as outpatient and complex care required. Inpatient admission is appropriate based on the Medicare guidelines.   The information on this document is developed by the utilization review team in order for the business office to ensure compliance. This only denotes the appropriateness of proper admission status and does not reflect the quality of care rendered.   The definitions of Inpatient Status and Observation Status used in making the determination above are those provided in the CMS Coverage Manual, Chapter 1 and Chapter 6, section 70.4.   Sincerely,   Dewayne Quinn MD  Utilization Review  Physician Advisor  [facility]

## 2021-09-16 NOTE — ED NOTES
[facility] ED Handoff Report    ED Chief Complaint: abdominal pain    ED Diagnosis:  (K91.850) Pouchitis (H)  (primary encounter diagnosis)  Comment: Patient reports improved pain since arrival  Plan: GI consult    (K29.71) Gastrointestinal hemorrhage associated with gastritis, unspecified gastritis type  Comment:   Plan: GI consult    (R10.13) Abdominal pain, epigastric  Comment: improved  Plan: GI consult    (R11.0) Nausea  Comment: denies   Plan:        PMH:  No past medical history on file.     Code Status:  Full Code     Falls Risk: No Band: Not applicable    Current Living Situation/Residence: lives alone     Elimination Status: Continent: No     Activity Level: Independent    Patients Preferred Language:  English     Needed: No    Vital Signs:  /56   Pulse 61   Temp 99.2  F (37.3  C)   Resp 24   Wt 54.4 kg (120 lb)   SpO2 97%   BMI 21.95 kg/m       Cardiac Rhythm: NA    Pain Score: 2/10    Is the Patient Confused:  No    Last Food or Drink: 9/15/21     Focused Assessment:  GI    Tests Performed: Done: Labs and Imaging    Treatments Provided:  See MAR    Family Dynamics/Concerns: No    Family Updated On Visitor Policy: Yes    Plan of Care Communicated to Family: Yes    Who Was Updated about Plan of Care: patient has updated daughter    Belongings Checklist Done and Signed by Patient: Yes    Covid: symptomatic, negative    Additional Information: Patient is very pleasant, is alert and oriented.   Has been rohan to toilet herself.      RN: Kayli Nieto    9/16/2021 12:47 PM

## 2021-09-16 NOTE — PHARMACY-ADMISSION MEDICATION HISTORY
Pharmacy Note - Admission Medication History    Pertinent Provider Information:      ______________________________________________________________________    Prior To Admission (PTA) med list completed and updated in EMR.       PTA Med List   Medication Sig Last Dose     buPROPion (WELLBUTRIN XL) 150 MG 24 hr tablet Take 150 mg by mouth every morning 9/9/2021 at Unknown time     ergocalciferol (VITAMIN D2) 50,000 unit capsule Take 50,000 Units by mouth once a week  More than a month at Unknown time     ibuprofen (ADVIL/MOTRIN) 200 MG tablet Take 200-400 mg by mouth 2 times daily as needed  More than a month at Unknown time     lamoTRIgine (LAMICTAL) 150 MG tablet [LAMOTRIGINE (LAMICTAL) 150 MG TABLET] Take 150 mg by mouth daily. 9/9/2021 at Unknown time     omeprazole (PRILOSEC) 20 MG capsule [OMEPRAZOLE (PRILOSEC) 20 MG CAPSULE] Take 20 mg by mouth daily. Take prn. 9/9/2021 at Unknown time     ondansetron (ZOFRAN) 8 MG tablet Take 8 mg by mouth every 8 hours as needed for nausea 9/15/2021 at 1100     propranolol (INDERAL) 20 MG tablet Take 20 mg by mouth 3 times daily as needed  9/15/2021 at am     sertraline (ZOLOFT) 100 MG tablet [SERTRALINE (ZOLOFT) 100 MG TABLET] Take 100 mg by mouth daily. 9/9/2021 at Unknown time     traZODone (DESYREL) 50 MG tablet Take  mg by mouth nightly as needed  Past Month at Unknown time       Information source(s): Patient and CareEverywhere/[facility]  Method of interview communication: in-person    Summary of Changes to PTA Med List  New: ondansetron  Discontinued: betamethasone, lithium  Changed: Wellbutrin SR to XL, propranolol dosing and frequency, trazodone dosing and frequency     Patient was asked about OTC/herbal products specifically.  PTA med list reflects this.    In the past week, patient estimated taking medication this percent of the time:  50-90% due to illness.    Allergies were reviewed, assessed, and updated with the patient.      Patient does not use any  multi-dose medications prior to admission.    The information provided in this note is only as accurate as the sources available at the time of the update(s).    Thank you for the opportunity to participate in the care of this patient.    Philomena Mcgarry [facility]  9/15/2021 10:09 PM

## 2021-09-16 NOTE — CONSULTS
"GASTROENTEROLOGY CONSULTATION     Dena Perales   [address] [facility] [address]   66 year old female   Admission Date/Time: 9/15/2021   Encounter Date: 9/16/2021  Primary Care Provider: Baylee Scherer     Referring / Attending Physician: Dr. Espinoza  We were asked to see the patient in consultation by Dr. Espinoza for evaluation of pouchitis.     HPI: Dena Perales is a 66 year old female  with past medical history of multiple abdominal surgeries including starla en y gastric bypass surgery for morbid obesity 16 years ago, barretts esophagus with last EGD in 2013, GERD/PUD (2013), mood disorder, kidney stones who presented today for evaluation of abdominal pain, nausea and decreased p.o. intake which began one week ago. Her history include lap assisted ERCP for CBD stone in 2015.     She initially was seen in urgent care, sent to the urgency room with recommendations to follow up with PCP. She had ongoing pain and was again seen with her PCP and referred to the ER since she has not been able to eat or drink since last Thursday due to pain and nausea.     She reports the pain comes and goes in the epigastric area. The pain is a sensation of being \"Kicked\". The pain is alleviated with bending over. She was prescribed zofran which was not helpful and omeprazole which may be providing some benefit. She denies weight loss or early satiety. She had black stools two days ago, two stools 6 hours apart HGB 13.9 on 9/15 now 11.5. WBC is normal. CT scan shows pouchitis and Cipro has been started. She has been started on pantoprazole.     She had a CT scan that showed intrarenal stone, but no other acute findings. Blood cultures have been negative. Urinalysis at PCP office showed blood in her urine and ketones. She was also noted to have moderate bilirubin in her urine, hepatic function panel normal 4 days ago. LFT normal upon admission.      In ER, patient was found to be hemodynamically stable.  Admission labs are " unremarkable.  CT abdomen done showed inflammation involving the gastric pouch and proximal Julianne loop suggesting gastritis/enteritis and concerns for pouchitis.  Patient admitted for further evaluation.    Cipro has been started.     No tobacco, rare alcohol. NSAID use of ibuprofen 2 to 3 days every few weeks for generalized pain.     Past Medical History  No past medical history on file.    Past Surgical History  Past Surgical History:   Procedure Laterality Date     GASTRIC BYPASS       HC REDUCTION OF LARGE BREAST      Description: Breast Surgery Reduction Procedure;  Recorded: 08/19/2013;     HC REMOVAL GALLBLADDER      Description: Cholecystectomy;  Recorded: 08/19/2013;     HC REPAIR INTERCARP/CARP-METACARP JT Left 6/23/2015    Procedure: LEFT CARPOMETACARPAL ARTHROPLASTY;  Surgeon: Omega Covington MD;  Location: [facility];  Service: Hand     OTHER SURGICAL HISTORY      tummy tuck     TN ERCP W/BIOPSY SINGLE/MULTIPLE         Family History  Family History   Problem Relation Age of Onset     Gout Mother      Osteoporosis Mother      Kidney Disease Mother      Breast Cancer Mother      Gout Father      Crohn's Disease Father      Osteoporosis Father      Kidney Disease Father      Diabetes Father      Urolithiasis Sister         recurrent     Heart Disease Sister         coronary artery disease     Hypertension Sister      Kidney Disease Sister      Breast Cancer Sister      Kidney Disease Brother      Urolithiasis Paternal Grandmother         single stone     Gout Paternal Grandmother      Breast Cancer Paternal Grandmother      Urolithiasis Paternal Grandfather         recurrent      Heart Disease Paternal Grandfather      Heart Disease Maternal Grandmother      Diabetes Maternal Grandmother      Cerebrovascular Disease Maternal Grandmother      Colon Cancer Maternal Grandfather        Social History  Social History     Socioeconomic History     Marital status: Single     Spouse name: Not on file      Number of children: Not on file     Years of education: Not on file     Highest education level: Not on file   Occupational History     Not on file   Tobacco Use     Smoking status: Never Smoker     Smokeless tobacco: Never Used   Substance and Sexual Activity     Alcohol use: Yes     Alcohol/week: 0.0 standard drinks     Drug use: No     Sexual activity: Not Currently   Other Topics Concern     Not on file   Social History Narrative     Not on file     Social Determinants of Health     Financial Resource Strain:      Difficulty of Paying Living Expenses:    Food Insecurity:      Worried About Running Out of Food in the Last Year:      Ran Out of Food in the Last Year:    Transportation Needs:      Lack of Transportation (Medical):      Lack of Transportation (Non-Medical):    Physical Activity:      Days of Exercise per Week:      Minutes of Exercise per Session:    Stress:      Feeling of Stress :    Social Connections:      Frequency of Communication with Friends and Family:      Frequency of Social Gatherings with Friends and Family:      Attends [employer] Services:      Active Member of Clubs or Organizations:      Attends Club or Organization Meetings:      Marital Status:    Intimate Partner Violence:      Fear of Current or Ex-Partner:      Emotionally Abused:      Physically Abused:      Sexually Abused:        Medications  Prior to Admission medications    Medication Sig Start Date End Date Taking? Authorizing Provider   buPROPion (WELLBUTRIN XL) 150 MG 24 hr tablet Take 150 mg by mouth every morning   Yes Unknown, Entered By History   ergocalciferol (VITAMIN D2) 50,000 unit capsule Take 50,000 Units by mouth once a week  8/8/14  Yes Provider, Historical   ibuprofen (ADVIL/MOTRIN) 200 MG tablet Take 200-400 mg by mouth 2 times daily as needed  3/12/15  Yes Provider, Historical   lamoTRIgine (LAMICTAL) 150 MG tablet [LAMOTRIGINE (LAMICTAL) 150 MG TABLET] Take 150 mg by mouth daily. 8/8/14  Yes Provider,  "Historical   omeprazole (PRILOSEC) 20 MG capsule [OMEPRAZOLE (PRILOSEC) 20 MG CAPSULE] Take 20 mg by mouth daily. Take prn. 8/8/14  Yes Provider, Historical   ondansetron (ZOFRAN) 8 MG tablet Take 8 mg by mouth every 8 hours as needed for nausea   Yes Unknown, Entered By History   propranolol (INDERAL) 20 MG tablet Take 20 mg by mouth 3 times daily as needed  8/8/14  Yes Provider, Historical   sertraline (ZOLOFT) 100 MG tablet [SERTRALINE (ZOLOFT) 100 MG TABLET] Take 100 mg by mouth daily. 8/8/14  Yes Provider, Historical   traZODone (DESYREL) 50 MG tablet Take  mg by mouth nightly as needed  8/8/14  Yes Provider, Historical       Allergies:  Patient has no known allergies.    ROS: A ten point review of systems was obtained and negative other than the symptoms noted above in the HPI.     Physical Exam:   /65 (BP Location: Right arm)   Pulse 70   Temp 98.2  F (36.8  C) (Oral)   Resp 18   Ht 1.575 m (5' 2\")   Wt 54.9 kg (121 lb)   SpO2 95%   BMI 22.13 kg/m     Constitutional:  alert, oriented x 3,  no acute distress, thin female  Cardiovascular: regular, rate and rhythm  Respiratory: clear to auscultation bilaterally, respirations non labored.   Psychiatric: normal pleasant affect  Head: atraumatic, normocephalic  ENT: mucous membranes are moist  Abdomen: epigastric tenderness to light palpation. Abdomen soft.   Neuro: Neurologically intact grossly  Skin: warm, dry, no rashes are noted    ADDITIONAL COMMENTS:   I reviewed the patient's new clinical lab test results.   Recent Labs   Lab Test 09/16/21  0831 09/15/21  1804   WBC  --  9.8   HGB 11.5* 13.9   MCV  --  90   PLT  --  328   INR 1.12  --       Recent Labs   Lab Test 09/15/21  1804      POTASSIUM 4.2   CHLORIDE 92*   CO2 31   BUN 8   CR 0.70   ANIONGAP 13   DIANNA 9.9      Recent Labs   Lab Test 09/15/21  1804   ALBUMIN 3.6   BILITOTAL 0.8   ALT 14   AST 22   ALKPHOS 54   LIPASE 23       I reviewed the patient's new imaging results.   EXAM: " CT ABDOMEN PELVIS W CONTRAST  LOCATION: [facility]  DATE/TIME: 9/15/2021 7:35 PM     INDICATION: Epigastric pain Nausea/vomiting  COMPARISON: 9/12/2021  TECHNIQUE: CT scan of the abdomen and pelvis was performed following injection of IV contrast. Multiplanar reformats were obtained. Dose reduction techniques were used.  CONTRAST: isovue 370 100ml     FINDINGS:   LOWER CHEST: Normal.     HEPATOBILIARY: Prior cholecystectomy.     PANCREAS: Normal.     SPLEEN: Normal.     ADRENAL GLANDS: Normal.     KIDNEYS/BLADDER: There are likely a few small stones within each kidney which appear unchanged, no hydronephrosis.     BOWEL: Patient appears of undergone a previous Julianne-en-Y gastric bypass procedure. There is an inflamed thick-walled appearance of the gastric remnant and likely a portion of the julianne loop. Mild amount of soft tissue stranding within the fat surrounds   gastric remnant and anastomosis. No definite perforation or ulceration. No free fluid or free air. The partitioned stomach remains normal. Remainder of small bowel and colon unremarkable.     LYMPH NODES: Normal.     VASCULATURE: Unremarkable.     PELVIC ORGANS: Probable small fibroid. No adnexal lesions. No free fluid.     MUSCULOSKELETAL: Unremarkable.                                                                      IMPRESSION:   1.  There now appears to be inflammatory wall thickening and slight surrounding inflammation involving the gastric pouch and proximal Julianne loop suggesting gastritis/enteritis. No definitive ulcer or perforation identified. Remainder of bowel   unremarkable.         Impression:   Dena Perales is a 66 year old female  with past medical history of multiple abdominal surgeries including julianne en y gastric bypass surgery for morbid obesity 16 years ago, barretts esophagus with last EGD in 2013, GERD/PUD (2013), mood disorder, kidney stones who presented today for evaluation of abdominal pain, nausea and  decreased p.o. intake which began one week ago with reports of melena and noted two gram HGB drop. CT with findings consistent with pouchitis.   1. Pouchitis differentials include PUD, neoplasm, hpylori gastritis, potentially NSAID induced pouch injury.EGD and PPI therapy.   2. Melena resolved - HGB drop 2 gm  3. Abdominal pain most consistent with gastric process, responding to omeprazole.     Plan:   EGD   IV PPI  Follow HGB transfuse PRN  Avoid NSAIDS.     I discussed the patient's findings and plan with Dr. Hong who will also independently see and examine the patient.       Jaimee Parham CNP   [facility]   Office number     The patient was seen and evaluated in conjunction with the advanced practice provider. Please see their note for details. History of julianne en Y and prior anastomotic ulcer in the setting of NSAID use. Presented with melena earlier in the week, nausea and reduce oral intake. Acute settings for the past week but she has had a lower appetite for months, per her daughter. Epigastric pain. No anticoagulation. Occasional ibuprofen. Vitals stable. A&O, NAD. Abd soft, mild epigastric tenderness. CT reviewed- inflammation around julianne loop and pouch. Hgb 11.5 down from 13.9 at presentation. Impression is melena and epigastric symptoms/nausea, likely gastritis or peptic ulcer disease. NSAID-induced Julianne limb inflammation very possible, as is anastomotic ulcer. Hemodynamically stable, will plan for EGD tomorrow morning. Okay for diet tonight (no bleeding since Tuesday). IV pantoprazole, trend hgb. NPO at midnight. No NSAIDS.     Darlyn Hong MD  9/16/20214:58 PM  [facility]

## 2021-09-17 LAB
ALBUMIN SERPL-MCNC: 2.5 G/DL (ref 3.5–5)
ALP SERPL-CCNC: 41 U/L (ref 45–120)
ALT SERPL W P-5'-P-CCNC: 10 U/L (ref 0–45)
ANION GAP SERPL CALCULATED.3IONS-SCNC: 7 MMOL/L (ref 5–18)
AST SERPL W P-5'-P-CCNC: 12 U/L (ref 0–40)
BASOPHILS # BLD AUTO: 0 10E3/UL (ref 0–0.2)
BASOPHILS NFR BLD AUTO: 1 %
BILIRUB DIRECT SERPL-MCNC: 0.2 MG/DL
BILIRUB SERPL-MCNC: 0.5 MG/DL (ref 0–1)
BUN SERPL-MCNC: 5 MG/DL (ref 8–22)
CALCIUM SERPL-MCNC: 7.8 MG/DL (ref 8.5–10.5)
CHLORIDE BLD-SCNC: 105 MMOL/L (ref 98–107)
CO2 SERPL-SCNC: 29 MMOL/L (ref 22–31)
CREAT SERPL-MCNC: 0.71 MG/DL (ref 0.6–1.1)
EOSINOPHIL # BLD AUTO: 0.2 10E3/UL (ref 0–0.7)
EOSINOPHIL NFR BLD AUTO: 3 %
ERYTHROCYTE [DISTWIDTH] IN BLOOD BY AUTOMATED COUNT: 14 % (ref 10–15)
GFR SERPL CREATININE-BSD FRML MDRD: 90 ML/MIN/1.73M2
GLUCOSE BLD-MCNC: 87 MG/DL (ref 70–125)
HCT VFR BLD AUTO: 33.9 % (ref 35–47)
HGB BLD-MCNC: 10.5 G/DL (ref 11.7–15.7)
HGB BLD-MCNC: 11.1 G/DL (ref 11.7–15.7)
HGB BLD-MCNC: 12.2 G/DL (ref 11.7–15.7)
IMM GRANULOCYTES # BLD: 0 10E3/UL
IMM GRANULOCYTES NFR BLD: 0 %
LYMPHOCYTES # BLD AUTO: 1.5 10E3/UL (ref 0.8–5.3)
LYMPHOCYTES NFR BLD AUTO: 25 %
MCH RBC QN AUTO: 29.1 PG (ref 26.5–33)
MCHC RBC AUTO-ENTMCNC: 31 G/DL (ref 31.5–36.5)
MCV RBC AUTO: 94 FL (ref 78–100)
MONOCYTES # BLD AUTO: 0.3 10E3/UL (ref 0–1.3)
MONOCYTES NFR BLD AUTO: 5 %
NEUTROPHILS # BLD AUTO: 3.9 10E3/UL (ref 1.6–8.3)
NEUTROPHILS NFR BLD AUTO: 66 %
NRBC # BLD AUTO: 0 10E3/UL
NRBC BLD AUTO-RTO: 0 /100
PLATELET # BLD AUTO: 233 10E3/UL (ref 150–450)
POTASSIUM BLD-SCNC: 3.3 MMOL/L (ref 3.5–5)
POTASSIUM BLD-SCNC: 3.4 MMOL/L (ref 3.5–5)
POTASSIUM BLD-SCNC: 3.9 MMOL/L (ref 3.5–5)
PROT SERPL-MCNC: 5.1 G/DL (ref 6–8)
RBC # BLD AUTO: 3.61 10E6/UL (ref 3.8–5.2)
SODIUM SERPL-SCNC: 141 MMOL/L (ref 136–145)
UPPER GI ENDOSCOPY: NORMAL
WBC # BLD AUTO: 5.9 10E3/UL (ref 4–11)

## 2021-09-17 PROCEDURE — 258N000003 HC RX IP 258 OP 636: Performed by: INTERNAL MEDICINE

## 2021-09-17 PROCEDURE — 85018 HEMOGLOBIN: CPT | Performed by: INTERNAL MEDICINE

## 2021-09-17 PROCEDURE — C9113 INJ PANTOPRAZOLE SODIUM, VIA: HCPCS | Performed by: INTERNAL MEDICINE

## 2021-09-17 PROCEDURE — 250N000011 HC RX IP 250 OP 636: Performed by: INTERNAL MEDICINE

## 2021-09-17 PROCEDURE — 250N000013 HC RX MED GY IP 250 OP 250 PS 637: Performed by: INTERNAL MEDICINE

## 2021-09-17 PROCEDURE — 43235 EGD DIAGNOSTIC BRUSH WASH: CPT | Performed by: INTERNAL MEDICINE

## 2021-09-17 PROCEDURE — 99232 SBSQ HOSP IP/OBS MODERATE 35: CPT | Performed by: HOSPITALIST

## 2021-09-17 PROCEDURE — 36415 COLL VENOUS BLD VENIPUNCTURE: CPT | Performed by: INTERNAL MEDICINE

## 2021-09-17 PROCEDURE — 88342 IMHCHEM/IMCYTCHM 1ST ANTB: CPT | Mod: TC | Performed by: INTERNAL MEDICINE

## 2021-09-17 PROCEDURE — 120N000001 HC R&B MED SURG/OB

## 2021-09-17 PROCEDURE — 82248 BILIRUBIN DIRECT: CPT | Performed by: INTERNAL MEDICINE

## 2021-09-17 PROCEDURE — 250N000009 HC RX 250: Performed by: INTERNAL MEDICINE

## 2021-09-17 PROCEDURE — 80175 DRUG SCREEN QUAN LAMOTRIGINE: CPT | Performed by: INTERNAL MEDICINE

## 2021-09-17 PROCEDURE — G0500 MOD SEDAT ENDO SERVICE >5YRS: HCPCS | Performed by: INTERNAL MEDICINE

## 2021-09-17 PROCEDURE — 84132 ASSAY OF SERUM POTASSIUM: CPT | Performed by: INTERNAL MEDICINE

## 2021-09-17 PROCEDURE — 85025 COMPLETE CBC W/AUTO DIFF WBC: CPT | Performed by: INTERNAL MEDICINE

## 2021-09-17 PROCEDURE — 36415 COLL VENOUS BLD VENIPUNCTURE: CPT | Performed by: HOSPITALIST

## 2021-09-17 PROCEDURE — 84132 ASSAY OF SERUM POTASSIUM: CPT | Performed by: HOSPITALIST

## 2021-09-17 PROCEDURE — 80048 BASIC METABOLIC PNL TOTAL CA: CPT | Performed by: INTERNAL MEDICINE

## 2021-09-17 RX ORDER — NALOXONE HYDROCHLORIDE 0.4 MG/ML
0.2 INJECTION, SOLUTION INTRAMUSCULAR; INTRAVENOUS; SUBCUTANEOUS
Status: DISCONTINUED | OUTPATIENT
Start: 2021-09-17 | End: 2021-09-19 | Stop reason: HOSPADM

## 2021-09-17 RX ORDER — EPINEPHRINE IN SOD CHLOR,ISO 1 MG/10 ML
SYRINGE (ML) INTRAVENOUS PRN
Status: COMPLETED | OUTPATIENT
Start: 2021-09-17 | End: 2021-09-17

## 2021-09-17 RX ORDER — NALOXONE HYDROCHLORIDE 0.4 MG/ML
0.4 INJECTION, SOLUTION INTRAMUSCULAR; INTRAVENOUS; SUBCUTANEOUS
Status: DISCONTINUED | OUTPATIENT
Start: 2021-09-17 | End: 2021-09-19 | Stop reason: HOSPADM

## 2021-09-17 RX ORDER — POTASSIUM CHLORIDE 1500 MG/1
20 TABLET, EXTENDED RELEASE ORAL ONCE
Status: COMPLETED | OUTPATIENT
Start: 2021-09-17 | End: 2021-09-17

## 2021-09-17 RX ORDER — FENTANYL CITRATE 50 UG/ML
INJECTION, SOLUTION INTRAMUSCULAR; INTRAVENOUS PRN
Status: COMPLETED | OUTPATIENT
Start: 2021-09-17 | End: 2021-09-17

## 2021-09-17 RX ORDER — OXYCODONE AND ACETAMINOPHEN 10; 325 MG/1; MG/1
1 TABLET ORAL EVERY 4 HOURS PRN
Status: DISCONTINUED | OUTPATIENT
Start: 2021-09-17 | End: 2021-09-17

## 2021-09-17 RX ORDER — OXYCODONE HYDROCHLORIDE 5 MG/1
5 TABLET ORAL EVERY 4 HOURS PRN
Status: DISCONTINUED | OUTPATIENT
Start: 2021-09-17 | End: 2021-09-19 | Stop reason: HOSPADM

## 2021-09-17 RX ORDER — OXYCODONE AND ACETAMINOPHEN 5; 325 MG/1; MG/1
1 TABLET ORAL EVERY 4 HOURS PRN
Status: DISCONTINUED | OUTPATIENT
Start: 2021-09-17 | End: 2021-09-19 | Stop reason: HOSPADM

## 2021-09-17 RX ADMIN — LAMOTRIGINE 150 MG: 150 TABLET ORAL at 10:16

## 2021-09-17 RX ADMIN — SODIUM CHLORIDE, PRESERVATIVE FREE: 5 INJECTION INTRAVENOUS at 00:32

## 2021-09-17 RX ADMIN — MIDAZOLAM HYDROCHLORIDE 1 MG: 1 INJECTION, SOLUTION INTRAMUSCULAR; INTRAVENOUS at 08:47

## 2021-09-17 RX ADMIN — SODIUM CHLORIDE, PRESERVATIVE FREE: 5 INJECTION INTRAVENOUS at 13:15

## 2021-09-17 RX ADMIN — CIPROFLOXACIN 400 MG: 2 INJECTION, SOLUTION INTRAVENOUS at 01:21

## 2021-09-17 RX ADMIN — SERTRALINE HYDROCHLORIDE 100 MG: 50 TABLET ORAL at 10:14

## 2021-09-17 RX ADMIN — FENTANYL CITRATE 75 MCG: 50 INJECTION, SOLUTION INTRAMUSCULAR; INTRAVENOUS at 08:44

## 2021-09-17 RX ADMIN — POTASSIUM CHLORIDE 20 MEQ: 20 TABLET, EXTENDED RELEASE ORAL at 10:13

## 2021-09-17 RX ADMIN — PANTOPRAZOLE SODIUM 40 MG: 40 INJECTION, POWDER, FOR SOLUTION INTRAVENOUS at 20:21

## 2021-09-17 RX ADMIN — PANTOPRAZOLE SODIUM 40 MG: 40 INJECTION, POWDER, FOR SOLUTION INTRAVENOUS at 10:13

## 2021-09-17 RX ADMIN — OXYCODONE HYDROCHLORIDE AND ACETAMINOPHEN 1 TABLET: 5; 325 TABLET ORAL at 13:24

## 2021-09-17 RX ADMIN — OXYCODONE HYDROCHLORIDE 5 MG: 5 TABLET ORAL at 01:19

## 2021-09-17 RX ADMIN — EPINEPHRINE 0.4 MG: 0.1 INJECTION, SOLUTION ENDOTRACHEAL; INTRACARDIAC; INTRAVENOUS at 09:00

## 2021-09-17 RX ADMIN — BUPROPION HYDROCHLORIDE 150 MG: 150 TABLET, EXTENDED RELEASE ORAL at 10:14

## 2021-09-17 RX ADMIN — MIDAZOLAM HYDROCHLORIDE 1 MG: 1 INJECTION, SOLUTION INTRAMUSCULAR; INTRAVENOUS at 09:00

## 2021-09-17 RX ADMIN — MIDAZOLAM HYDROCHLORIDE 1 MG: 1 INJECTION, SOLUTION INTRAMUSCULAR; INTRAVENOUS at 08:55

## 2021-09-17 RX ADMIN — OXYCODONE HYDROCHLORIDE 5 MG: 5 TABLET ORAL at 23:51

## 2021-09-17 RX ADMIN — FENTANYL CITRATE 25 MCG: 50 INJECTION, SOLUTION INTRAMUSCULAR; INTRAVENOUS at 08:52

## 2021-09-17 RX ADMIN — BENZOCAINE 2 SPRAY: 220 SPRAY, METERED PERIODONTAL at 08:45

## 2021-09-17 RX ADMIN — CIPROFLOXACIN 400 MG: 2 INJECTION, SOLUTION INTRAVENOUS at 13:15

## 2021-09-17 RX ADMIN — MIDAZOLAM HYDROCHLORIDE 2 MG: 1 INJECTION, SOLUTION INTRAMUSCULAR; INTRAVENOUS at 08:44

## 2021-09-17 RX ADMIN — SODIUM CHLORIDE, PRESERVATIVE FREE: 5 INJECTION INTRAVENOUS at 23:52

## 2021-09-17 ASSESSMENT — ACTIVITIES OF DAILY LIVING (ADL)
VISION_MANAGEMENT: GLASSES
CONCENTRATING,_REMEMBERING_OR_MAKING_DECISIONS_DIFFICULTY: NO
WALKING_OR_CLIMBING_STAIRS_DIFFICULTY: NO
DOING_ERRANDS_INDEPENDENTLY_DIFFICULTY: YES
DRESSING/BATHING_DIFFICULTY: NO
TOILETING_ISSUES: NO
WEAR_GLASSES_OR_BLIND: YES
DIFFICULTY_EATING/SWALLOWING: NO
DIFFICULTY_COMMUNICATING: NO

## 2021-09-17 ASSESSMENT — MIFFLIN-ST. JEOR: SCORE: 1051.2

## 2021-09-17 NOTE — PLAN OF CARE
Problem: Bleeding (Gastrointestinal Bleeding)  Goal: Hemostasis  Outcome: Improving   Vitals stable.   No stools or emesis this shift.    Patient reports pain to her abdomen but declines and intervention at this time. She ate dinner and will be NPO at midnight for procedure.

## 2021-09-17 NOTE — PROGRESS NOTES
.  Hospitalist Progress Note    Assessment/Plan  Active Problems:    Abdominal pain, epigastric    Nausea    Pouchitis (H)    Gastrointestinal hemorrhage associated with gastritis, unspecified gastritis type    Dena Perales is a 66 year old female with history of multiple abdominal surgeries presented with abdominal pain and nausea. CT abdomen concerned for inflammation involving the gastric pouch and proximal Julianne loop suggesting gastritis/enteritis.     Abdominal pain and nausea  -Patient has history of multiple abdominal surgeries comes in with abdominal pain of 5 days duration PTA.  CT abdomen concern for inflammation involving the gastric pouch and proximal Julianne loop suggesting gastritis/enteritis.  -Started on Cipro on admission, continue for now. Can discontinue if ok with GI.  -Zofran prn for N/V  -GI following , appreciate input.        Upper GI bleed  -S/p EGD which revealed non-bleeding gastric ulcer with a visible vessel treated with cauterization and clips. Repeat EGD in 2 months to check for healing. Path pending.  -Hb on admission was 13.9 which trended down to 10.5 .  -Continue to monitor hemoglobin every 6 hours  -Continue PPI twice daily  -Appreciate GI input.  -On clear liquid diet only     Mood disorder-Stable  -Continue PTA Wellbutrin,  Zoloft, lamictal at home dose , trazodone as needed.     HypoK - Replete per protocol    DVT ppx:SCD  Diet: Clear liquid diet    Barriers to Discharge:  Hb monitoring    Anticipated discharge date/Disposition: 1-2 days    Subjective  Chart reviewed and events noted.  Patient seen and examined.   Pt seen post EGD. States pain abd better. No N/V. No chest pain, sob.       Objective    Vital signs in last 24 hours  Temp:  [98  F (36.7  C)-98.8  F (37.1  C)] 98.8  F (37.1  C)  Pulse:  [52-82] 62  Resp:  [12-26] 16  BP: ()/(51-82) 137/66  SpO2:  [91 %-100 %] 92 % [unfilled] O2 Device: None (Room air)    Weight:   [unfilled] Weight change: 0.454 kg (1  lb)    Intake/Output last 3 shifts  I/O last 3 completed shifts:  In: 120 [P.O.:120]  Out: -   Body mass index is 22.13 kg/m .    Physical Exam    General Appearance:    Alert, cooperative, no distress, appears stated age   Lungs:     CTAB   Cardiovascular:    RRR   Abdomen:     Soft, non-distended, mild diffuse tenderness   Neurologic:   Grossly normal     Pertinent Labs   Lab Results: personally reviewed.   Recent Labs   Lab 09/17/21  0712 09/15/21  1804    136   CO2 29 31   BUN 5* 8   ALBUMIN 2.5* 3.6   ALKPHOS 41* 54   ALT 10 14   AST 12 22     Recent Labs   Lab 09/17/21  1012 09/17/21  0712 09/16/21  2346 09/16/21  0831 09/15/21  1804   WBC  --  5.9  --   --  9.8   HGB 12.2 10.5* 10.7*   < > 13.9   HCT  --  33.9*  --   --  42.7   PLT  --  233  --   --  328    < > = values in this interval not displayed.     No results for input(s): CKTOTAL, TROPONINI in the last 168 hours.    Invalid input(s): TROPONINT, CKMBINDEX  Invalid input(s): POCGLUFGR    Medications  Drug and lactation database from the [place] National Library of Medicine:  http://toxnet.nlm.nih.gov/cgi-bin/sis/htmlgen?LACT      Pertinent Radiology   Radiology Results:  Personally reviewed impressions    Reviewed labs in last 24 hours.    Advanced Care Planning:  Discharge Planning discussed with patient  Total time with this patient is 35 min with greater than 50% of time spent in coordination of care.  Care discussed and coordinated with her care team.    This Note is created using dragon voice recognition software.  Errors in spelling or words which seems out of context are unintentional.  Sounds alike errors may have escaped editing.    Ly Pacheco MD  Hospitalist

## 2021-09-17 NOTE — PLAN OF CARE
Problem: Adult Inpatient Plan of Care  Goal: Optimal Comfort and Wellbeing  Outcome: No Change     Problem: Adjustment to Illness (Gastrointestinal Bleeding)  Goal: Optimal Coping with Acute Illness  Outcome: Improving   Patient continue with slight abdominal pain, VS with in normal,  one time PRN Oxycodone, no BM this shift.  NPO for procedure.

## 2021-09-17 NOTE — PLAN OF CARE
Problem: Adult Inpatient Plan of Care  Goal: Plan of Care Review  Outcome: Improving     Problem: Adjustment to Illness (Gastrointestinal Bleeding)  Goal: Optimal Coping with Acute Illness  Outcome: Improving     Problem: Bleeding (Gastrointestinal Bleeding)  Goal: Hemostasis  Outcome: Improving    Pt had EGD today.  Tolerating clear liquid diet.  Gave percocet x 1 per prn order for abdominal pain.   Pt slept off/on after EGD.  No BM this shift, no bleeding noted.

## 2021-09-17 NOTE — PRE-PROCEDURE
GENERAL PRE-PROCEDURE:   Procedure:  Esophagogastroduodenoscopy  Date/Time:  9/17/2021 8:42 AM    Verbal consent obtained?: Yes    Written consent obtained?: Yes    Risks and benefits: Risks, benefits and alternatives were discussed    Consent given by:  Patient  Patient states understanding of procedure being performed: Yes    Patient's understanding of procedure matches consent: Yes    Procedure consent matches procedure scheduled: Yes    Expected level of sedation:  Moderate  Appropriately NPO:  Yes  ASA Class:  2  Mallampati  :  Grade 1- soft palate, uvula, tonsillar pillars, and posterior pharyngeal wall visible  Lungs:  Lungs clear with good breath sounds bilaterally  Heart:  Normal heart sounds and rate  History & Physical reviewed:  History and physical reviewed and no updates needed  Statement of review:  I have reviewed the lab findings, diagnostic data, medications, and the plan for sedation

## 2021-09-17 NOTE — PROGRESS NOTES
Care Management Follow Up    Length of Stay (days): 1    Expected Discharge Date:  09/19/2021     Concerns to be Addressed: Abdominal pain and nausea, work up in process      Patient plan of care discussed at interdisciplinary rounds: Yes    Anticipated Discharge Disposition:  home     Anticipated Discharge Services:  TBD        Additional Information:  Patient admitted for evaluation and treatment of abdominal  Pain and nausea, upper GI bleed.    Assessment History: Patient independent at baseline and lives with her daughter Lena.     Anticipating return home. Final discharge needs pending progression and recommendations.     Rosa Stahl RN

## 2021-09-18 LAB
HGB BLD-MCNC: 10.5 G/DL (ref 11.7–15.7)
HGB BLD-MCNC: 10.6 G/DL (ref 11.7–15.7)
HGB BLD-MCNC: 11 G/DL (ref 11.7–15.7)
HGB BLD-MCNC: 11.2 G/DL (ref 11.7–15.7)
POTASSIUM BLD-SCNC: 3.7 MMOL/L (ref 3.5–5)

## 2021-09-18 PROCEDURE — 85018 HEMOGLOBIN: CPT | Performed by: INTERNAL MEDICINE

## 2021-09-18 PROCEDURE — 250N000013 HC RX MED GY IP 250 OP 250 PS 637: Performed by: INTERNAL MEDICINE

## 2021-09-18 PROCEDURE — 36415 COLL VENOUS BLD VENIPUNCTURE: CPT | Performed by: HOSPITALIST

## 2021-09-18 PROCEDURE — 36415 COLL VENOUS BLD VENIPUNCTURE: CPT | Performed by: INTERNAL MEDICINE

## 2021-09-18 PROCEDURE — 84132 ASSAY OF SERUM POTASSIUM: CPT | Performed by: HOSPITALIST

## 2021-09-18 PROCEDURE — 85018 HEMOGLOBIN: CPT | Performed by: HOSPITALIST

## 2021-09-18 PROCEDURE — 258N000003 HC RX IP 258 OP 636: Performed by: INTERNAL MEDICINE

## 2021-09-18 PROCEDURE — C9113 INJ PANTOPRAZOLE SODIUM, VIA: HCPCS | Performed by: INTERNAL MEDICINE

## 2021-09-18 PROCEDURE — 250N000011 HC RX IP 250 OP 636: Performed by: INTERNAL MEDICINE

## 2021-09-18 PROCEDURE — 99231 SBSQ HOSP IP/OBS SF/LOW 25: CPT | Performed by: HOSPITALIST

## 2021-09-18 PROCEDURE — 120N000001 HC R&B MED SURG/OB

## 2021-09-18 RX ORDER — PANTOPRAZOLE SODIUM 20 MG/1
40 TABLET, DELAYED RELEASE ORAL
Status: DISCONTINUED | OUTPATIENT
Start: 2021-09-18 | End: 2021-09-19 | Stop reason: HOSPADM

## 2021-09-18 RX ADMIN — SODIUM CHLORIDE, PRESERVATIVE FREE: 5 INJECTION INTRAVENOUS at 09:54

## 2021-09-18 RX ADMIN — SERTRALINE HYDROCHLORIDE 100 MG: 50 TABLET ORAL at 08:22

## 2021-09-18 RX ADMIN — CIPROFLOXACIN 400 MG: 2 INJECTION, SOLUTION INTRAVENOUS at 03:41

## 2021-09-18 RX ADMIN — PANTOPRAZOLE SODIUM 40 MG: 40 INJECTION, POWDER, FOR SOLUTION INTRAVENOUS at 08:23

## 2021-09-18 RX ADMIN — PANTOPRAZOLE SODIUM 40 MG: 20 TABLET, DELAYED RELEASE ORAL at 17:01

## 2021-09-18 RX ADMIN — LAMOTRIGINE 150 MG: 150 TABLET ORAL at 08:22

## 2021-09-18 RX ADMIN — BUPROPION HYDROCHLORIDE 150 MG: 150 TABLET, EXTENDED RELEASE ORAL at 08:22

## 2021-09-18 ASSESSMENT — MIFFLIN-ST. JEOR: SCORE: 1056.18

## 2021-09-18 NOTE — PLAN OF CARE
Problem: Pain Acute  Goal: Acceptable Pain Control and Functional Ability  Outcome: Improving  Intervention: Develop Pain Management Plan  Recent Flowsheet Documentation  Taken 9/17/2021 2351 by Glenny Santillan, RN  Pain Management Interventions: medication (see MAR)  Pt c/o pain to abdomen, PRN oxycodone 5 mg given with improvement. Pt tolerated ambulation to the bathroom.    Problem: Adult Inpatient Plan of Care  Goal: Absence of Hospital-Acquired Illness or Injury  Intervention: Identify and Manage Fall Risk  Recent Flowsheet Documentation  Taken 9/17/2021 2351 by Glenny Santillan, RN  Safety Promotion/Fall Prevention:   mobility aid in reach   patient and family education   lighting adjusted   nonskid shoes/slippers when out of bed  Pt is up ad hazel with steady gait. Pt able to call appropriately for assistance. Remained free of fall this shift.

## 2021-09-18 NOTE — PROGRESS NOTES
.  Hospitalist Progress Note    Assessment/Plan  Active Problems:    Abdominal pain, epigastric    Nausea    Pouchitis (H)    Gastrointestinal hemorrhage associated with gastritis, unspecified gastritis type    Dena Perales is a 66 year old female with history of multiple abdominal surgeries presented with abdominal pain and nausea. CT abdomen concerned for inflammation involving the gastric pouch and proximal Julianne loop suggesting gastritis/enteritis.     Abdominal pain and nausea  -Patient has history of multiple abdominal surgeries comes in with abdominal pain of 5 days duration PTA.  CT abdomen concern for inflammation involving the gastric pouch and proximal Julianne loop suggesting gastritis/enteritis.  -Zofran prn for N/V  -GI following , appreciate input.  -Stop Cipro today  -Advancing diet to full liquid        Upper GI bleed  -S/p EGD which revealed non-bleeding gastric ulcer with a visible vessel treated with cauterization and clips. Repeat EGD in 2 months to check for healing. Path pending.  -Hb on admission was 13.9 , 11 today  -Continue to monitor hemoglobin every 8 hours  -Continue PPI twice daily  -Appreciate GI input.  -On full liquid diet today     Mood disorder-Stable  -Continue PTA Wellbutrin,  Zoloft, lamictal at home dose , trazodone as needed.     HypoK - Replete per protocol    DVT ppx:SCD  Diet: Full liquid diet, ADAT    Barriers to Discharge:  Hb monitoring, tolerating PO diet    Anticipated discharge date/Disposition: Tomorrow    Subjective  Chart reviewed and events noted.  Patient seen and examined.   States feels ok today. Abd pain in mild. Had 1 tarry BM yest but not as dark as prior. No chest pain, sob, dizziness.      Objective    Vital signs in last 24 hours  Temp:  [97.9  F (36.6  C)-98.8  F (37.1  C)] 97.9  F (36.6  C)  Pulse:  [55-63] 55  Resp:  [16-20] 20  BP: (112-137)/(55-66) 124/57  SpO2:  [92 %-97 %] 97 % [unfilled] O2 Device: None (Room air)    Weight:   [unfilled] Weight  change: -0.091 kg (-3.2 oz)    Intake/Output last 3 shifts  I/O last 3 completed shifts:  In: 2841 [P.O.:720; I.V.:2121]  Out: -   Body mass index is 22.3 kg/m .    Physical Exam    General Appearance:    Alert, cooperative, no distress, appears stated age   Lungs:     CTAB   Cardiovascular:    RRR   Abdomen:     Soft, non-distended, mild epigastric  tenderness   Neurologic:   Grossly normal     Pertinent Labs   Lab Results: personally reviewed.   Recent Labs   Lab 09/17/21  0712 09/15/21  1804    136   CO2 29 31   BUN 5* 8   ALBUMIN 2.5* 3.6   ALKPHOS 41* 54   ALT 10 14   AST 12 22     Recent Labs   Lab 09/18/21  0857 09/18/21  0006 09/17/21  1826 09/17/21  1012 09/17/21  0712 09/16/21  0831 09/15/21  1804   WBC  --   --   --   --  5.9  --  9.8   HGB 11.0* 10.6* 11.1*   < > 10.5*   < > 13.9   HCT  --   --   --   --  33.9*  --  42.7   PLT  --   --   --   --  233  --  328    < > = values in this interval not displayed.     No results for input(s): CKTOTAL, TROPONINI in the last 168 hours.    Invalid input(s): TROPONINT, CKMBINDEX  Invalid input(s): POCGLUFGR    Medications  Drug and lactation database from the [place] National Library of Medicine:  http://toxnet.nlm.nih.gov/cgi-bin/sis/htmlgen?LACT      Pertinent Radiology   Radiology Results:  Personally reviewed impressions    Reviewed labs in last 24 hours.    Advanced Care Planning:  Discharge Planning discussed with patient  Total time with this patient is 35 min with greater than 50% of time spent in coordination of care.  Care discussed and coordinated with her care team.    This Note is created using dragon voice recognition software.  Errors in spelling or words which seems out of context are unintentional.  Sounds alike errors may have escaped editing.    Ly Pacheco MD  Hospitalist

## 2021-09-18 NOTE — PROGRESS NOTES
"  GASTROENTEROLOGY PROGRESS NOTE     SUBJECTIVE   No gross bleeding. Advancing to full liquids. Agrees to stop using NSAIDS.     OBJECTIVE     Vitals Blood pressure 124/57, pulse 55, temperature 97.9  F (36.6  C), temperature source Oral, resp. rate 20, height 1.575 m (5' 2\"), weight 55.3 kg (121 lb 14.4 oz), SpO2 97 %.      Physical exam:    A&O, NAD  Abd soft, NT/ND    LABORATORY    ELECTROLYTE PANEL   Recent Labs   Lab 09/18/21  0857 09/17/21  1419 09/17/21  1012 09/17/21  0712 09/17/21  0712 09/15/21  1804 09/15/21  1804   NA  --   --   --   --  141  --  136   POTASSIUM 3.7 3.9 3.4*   < > 3.3*   < > 4.2   CHLORIDE  --   --   --   --  105  --  92*   CO2  --   --   --   --  29  --  31   GLC  --   --   --   --  87  --  123   CR  --   --   --   --  0.71  --  0.70   BUN  --   --   --   --  5*  --  8    < > = values in this interval not displayed.      HEMATOLOGY PANEL   Recent Labs   Lab 09/18/21  0857 09/18/21  0006 09/17/21  1826 09/17/21  1012 09/17/21  0712 09/16/21  1803 09/16/21  0831 09/15/21  1804 09/15/21  1804   HGB 11.0* 10.6* 11.1*   < > 10.5*   < > 11.5*   < > 13.9   MCV  --   --   --   --  94  --   --   --  90   WBC  --   --   --   --  5.9  --   --   --  9.8   PLT  --   --   --   --  233  --   --   --  328   INR  --   --   --   --   --   --  1.12  --   --     < > = values in this interval not displayed.      LIVER AND PANCREAS PANEL   Recent Labs   Lab 09/17/21  0712 09/15/21  1804   AST 12 22   ALT 10 14   ALKPHOS 41* 54   BILITOTAL 0.5 0.8   LIPASE  --  23     IMAGING STUDIES        I have reviewed the current diagnostic and laboratory tests.              IMPRESSION   1) GI bleed attributed to a large anastomotic ulcer- with visible vessel status post bicap and hemoclip placement. No further gross bleeding though hgb has drifted a bit. Likely NSAID-induced though biopsies are pending.  2) status post starla en y     RECOMMENDATIONS   ADAT  Continue protonix- will change to oral twice daily x 2 " months  Repeat EGD in 2 months to confirm ulcer healing- we will arrange  OK for discharge later today vs tomorrow, depending on stability.  Await biopsy results- treat H pylori if positive for infection  Avoid all NSAIDS.             Darlyn Hong MD  Thank you for the opportunity to participate in the care of this patient.   Please feel free to call me with any questions or concerns.  Phone number [phone].

## 2021-09-18 NOTE — PLAN OF CARE
Problem: Adult Inpatient Plan of Care  Goal: Plan of Care Review  Outcome: Improving     Problem: Pain Acute  Goal: Acceptable Pain Control and Functional Ability  Outcome: Improving  Intervention: Develop Pain Management Plan  Recent Flowsheet Documentation  Taken 9/18/2021 1230 by Mary Lindsay RN  Pain Management Interventions:   declines   emotional support  Taken 9/18/2021 0827 by Mary Lindsay, RN  Pain Management Interventions:   declines   emotional support    Pt rates pain at a 2 or less all shift, no PRN meds given.  Pt eating and drinking well, regular diet.  Able to pass flatus after lunch, no BM yet.

## 2021-09-18 NOTE — PLAN OF CARE
Problem: Adult Inpatient Plan of Care  Goal: Plan of Care Review  Outcome: Improving     Problem: Adult Inpatient Plan of Care  Goal: Absence of Hospital-Acquired Illness or Injury  Intervention: Identify and Manage Fall Risk  Recent Flowsheet Documentation  Taken 9/17/2021 1645 by Puja Michelle, RN  Safety Promotion/Fall Prevention:   activity supervised   nonskid shoes/slippers when out of bed   safety round/check completed     Problem: Bleeding (Gastrointestinal Bleeding)  Goal: Hemostasis  Outcome: Improving     Problem: Pain Acute  Goal: Acceptable Pain Control and Functional Ability  Intervention: Develop Pain Management Plan  Recent Flowsheet Documentation  Taken 9/17/2021 1857 by Puja Michelle, RN  Pain Management Interventions: emotional support  Taken 9/17/2021 1640 by Puja Michelle, RN  Pain Management Interventions: emotional support     Plan for the evening reviewed including medications, diet, safety.  Pt is steady on her feet, just likes help with her IV pole.  Loose stool reported but I didn't hear what color it was.  Pt rates pain at 1-2, feels better.  Tolerating clear liquids.  Hgb increasing.

## 2021-09-19 VITALS
RESPIRATION RATE: 18 BRPM | BODY MASS INDEX: 22.43 KG/M2 | SYSTOLIC BLOOD PRESSURE: 138 MMHG | WEIGHT: 121.9 LBS | HEART RATE: 54 BPM | OXYGEN SATURATION: 99 % | TEMPERATURE: 98.2 F | HEIGHT: 62 IN | DIASTOLIC BLOOD PRESSURE: 63 MMHG

## 2021-09-19 LAB
HGB BLD-MCNC: 11.4 G/DL (ref 11.7–15.7)
POTASSIUM BLD-SCNC: 4 MMOL/L (ref 3.5–5)

## 2021-09-19 PROCEDURE — 99239 HOSP IP/OBS DSCHRG MGMT >30: CPT | Performed by: HOSPITALIST

## 2021-09-19 PROCEDURE — 84132 ASSAY OF SERUM POTASSIUM: CPT | Performed by: HOSPITALIST

## 2021-09-19 PROCEDURE — 36415 COLL VENOUS BLD VENIPUNCTURE: CPT | Performed by: HOSPITALIST

## 2021-09-19 PROCEDURE — 85018 HEMOGLOBIN: CPT | Performed by: HOSPITALIST

## 2021-09-19 PROCEDURE — 250N000013 HC RX MED GY IP 250 OP 250 PS 637: Performed by: INTERNAL MEDICINE

## 2021-09-19 RX ADMIN — SERTRALINE HYDROCHLORIDE 100 MG: 50 TABLET ORAL at 08:05

## 2021-09-19 RX ADMIN — LAMOTRIGINE 150 MG: 150 TABLET ORAL at 08:05

## 2021-09-19 RX ADMIN — BUPROPION HYDROCHLORIDE 150 MG: 150 TABLET, EXTENDED RELEASE ORAL at 08:05

## 2021-09-19 RX ADMIN — PANTOPRAZOLE SODIUM 40 MG: 20 TABLET, DELAYED RELEASE ORAL at 07:08

## 2021-09-19 NOTE — PROGRESS NOTES
"  GASTROENTEROLOGY PROGRESS NOTE     SUBJECTIVE   No bleeding. Ready to go home. Agrees to avoid NSAIDS.     OBJECTIVE     Vitals Blood pressure 138/63, pulse 54, temperature 98.2  F (36.8  C), temperature source Oral, resp. rate 18, height 1.575 m (5' 2\"), weight 55.3 kg (121 lb 14.4 oz), SpO2 99 %.      Physical exam:    A&O, in chair, NAD  Abd benign    LABORATORY    ELECTROLYTE PANEL   Recent Labs   Lab 09/19/21  0743 09/18/21  0857 09/17/21  1419 09/17/21  1012 09/17/21  0712 09/15/21  1804 09/15/21  1804   NA  --   --   --   --  141  --  136   POTASSIUM 4.0 3.7 3.9   < > 3.3*   < > 4.2   CHLORIDE  --   --   --   --  105  --  92*   CO2  --   --   --   --  29  --  31   GLC  --   --   --   --  87  --  123   CR  --   --   --   --  0.71  --  0.70   BUN  --   --   --   --  5*  --  8    < > = values in this interval not displayed.      HEMATOLOGY PANEL   Recent Labs   Lab 09/19/21  0743 09/18/21  2219 09/18/21  1521 09/17/21  1012 09/17/21  0712 09/16/21  1803 09/16/21  0831 09/15/21  1804 09/15/21  1804   HGB 11.4* 10.5* 11.2*   < > 10.5*   < > 11.5*   < > 13.9   MCV  --   --   --   --  94  --   --   --  90   WBC  --   --   --   --  5.9  --   --   --  9.8   PLT  --   --   --   --  233  --   --   --  328   INR  --   --   --   --   --   --  1.12  --   --     < > = values in this interval not displayed.      LIVER AND PANCREAS PANEL   Recent Labs   Lab 09/17/21  0712 09/15/21  1804   AST 12 22   ALT 10 14   ALKPHOS 41* 54   BILITOTAL 0.5 0.8   LIPASE  --  23     IMAGING STUDIES        I have reviewed the current diagnostic and laboratory tests.              IMPRESSION   1) GI bleed attributed to a large anastomotic ulcer- with visible vessel status post bicap and hemoclip placement. No further gross bleeding though hgb has drifted a bit. Likely NSAID-induced though biopsies are pending.  2) status post starla en y     RECOMMENDATIONS   Okay with discharge today  Avoid all NSAIDS  Will send biopsy results to patient when " they are back  Is on omeprazole as outpatient- recommend 20mg twice daily (alternately, pantoprazole 40mg twice daily is also reasonable)  Repeat EGD in 2 months to confirm ulcer healing- [facility] to arrange.             Darlyn Hong MD  Thank you for the opportunity to participate in the care of this patient.   Please feel free to call me with any questions or concerns.  Phone number [phone].

## 2021-09-19 NOTE — PLAN OF CARE
Problem: Adult Inpatient Plan of Care  Goal: Plan of Care Review  Outcome: Improving  Tx plan explained, pt verbalized understanding. Pt expressed readiness to discharge later this AM. Pt looking forward to go home.    Problem: Pain Acute  Goal: Acceptable Pain Control and Functional Ability  Outcome: Improving  Pt denies pain/discomfort, observed sleeping comfortably. Pt ambulated to the bathroom without difficulty and tolerated activity.

## 2021-09-19 NOTE — PLAN OF CARE
Problem: Adult Inpatient Plan of Care  Goal: Plan of Care Review  Outcome: Improving     Problem: Bleeding (Gastrointestinal Bleeding)  Goal: Hemostasis  Outcome: Improving     Problem: Pain Acute  Goal: Acceptable Pain Control and Functional Ability  Outcome: Improving   Plan for the evening reviewed including medications, diet, activity.  Pt rated pain at a 2, did not request any pain medications.  Tolerating regular diet but didn't like her dinner.  Ambulating in the owen independently.  No BM tonight.

## 2021-09-19 NOTE — DISCHARGE SUMMARY
.    Discharge Summary     Primary Care Physician: Baylee Scherer  Admission Date: 9/15/2021   Discharge Provider: Ly Pacheco MD Discharge Date: 9/19/2021   Diet: Regular   Code Status: Full Code   Activity: As tolerated        Condition at Discharge: Stable      REASON FOR PRESENTATION(See Admission Note for Details)     Abdominal pain    PRINCIPAL & ACTIVE DISCHARGE DIAGNOSES     Active Problems:    Abdominal pain, epigastric    Nausea    Pouchitis (H)    Gastrointestinal hemorrhage associated with gastritis, unspecified gastritis type      SIGNIFICANT FINDINGS (Imaging, labs):     CT Abdomen Pelvis w Contrast  Result Date: 9/15/2021    IMPRESSION: 1.  There now appears to be inflammatory wall thickening and slight surrounding inflammation involving the gastric pouch and proximal Julianne loop suggesting gastritis/enteritis. No definitive ulcer or perforation identified. Remainder of bowel unremarkable.      PENDING LABS     [unfilled]    PROCEDURES ( this hospitalization only)      Procedure(s):  ESOPHAGOGASTRODUODENOSCOPY (EGD) with gastric biopsies, 0.4 mg of epinepherine injection, & bicap to gastric ulcer    RECOMMENDATIONS TO OUTPATIENT PROVIDER FOR F/U VISIT     Follow up with PCP in 1 week  Follow up with GI in 2 months for repeat EGD    DISPOSITION     Home    SUMMARY OF HOSPITAL COURSE:      Dena Perales is a 66 year old female with history of multiple abdominal surgeries presented with abdominal pain and nausea. CT abdomen concerned for inflammation involving the gastric pouch and proximal Julianne loop suggesting gastritis/enteritis.     Abdominal pain and nausea - resolved  -Patient has history of multiple abdominal surgeries comes in with abdominal pain of 5 days duration PTA.  CT abdomen concern for inflammation involving the gastric pouch and proximal Julianne loop suggesting gastritis/enteritis.  -S/p EGD which shows gastric ulcer.   -Rx with cipro initially, discontinued prior to  discharge  -Appreciate GI input        Upper GI bleed  -S/p EGD which revealed non-bleeding gastric ulcer with a visible vessel treated with cauterization and clips. Repeat EGD in 2 months to check for healing. Path pending.  -No NSAIDS, d/w patient.  -Hb on admission was 13.9 , 11.4 on day of discharge.  -Continue PPI twice daily  -Appreciate GI input, follow up in 2 months as out-patient.     Mood disorder-Stable  -Continue PTA Wellbutrin,  Zoloft, lamictal at home dose , trazodone as needed.     HypoK - Replaced as needed.     Patient stable to be discharged home today. Please refer to discharge medications and instructions for more details.      Discharge Medications with Med changes:     Current Discharge Medication List      CONTINUE these medications which have CHANGED    Details   omeprazole (PRILOSEC) 20 MG DR capsule Take 1 capsule (20 mg) by mouth 2 times daily  Qty: 60 capsule, Refills: 0    Associated Diagnoses: Gastrointestinal hemorrhage associated with gastritis, unspecified gastritis type; Peptic ulcer         CONTINUE these medications which have NOT CHANGED    Details   buPROPion (WELLBUTRIN XL) 150 MG 24 hr tablet Take 150 mg by mouth every morning      ergocalciferol (VITAMIN D2) 50,000 unit capsule Take 50,000 Units by mouth once a week       lamoTRIgine (LAMICTAL) 150 MG tablet [LAMOTRIGINE (LAMICTAL) 150 MG TABLET] Take 150 mg by mouth daily.      ondansetron (ZOFRAN) 8 MG tablet Take 8 mg by mouth every 8 hours as needed for nausea      propranolol (INDERAL) 20 MG tablet Take 20 mg by mouth 3 times daily as needed       sertraline (ZOLOFT) 100 MG tablet [SERTRALINE (ZOLOFT) 100 MG TABLET] Take 100 mg by mouth daily.      traZODone (DESYREL) 50 MG tablet Take  mg by mouth nightly as needed          STOP taking these medications       ibuprofen (ADVIL/MOTRIN) 200 MG tablet Comments:   Reason for Stopping:                   Rationale for medication changes:              Consults    GI      Immunizations given this encounter     [unfilled]       Anticoagulation Information      Recent INR results:   Recent Labs   Lab 09/16/21  0831   INR 1.12     Warfarin doses (if applicable) or name of other anticoagulant: N/A      Discharge Orders     Discharge Procedure Orders   Reason for your hospital stay   Order Comments: Abdominal pain     Follow-up and recommended labs and tests    Order Comments: Follow up with primary care provider, Baylee Scherer, within 7 days for hospital follow- up.  No follow up labs or test are needed.    Follow up with GI in 2 months for repeat EGD     Activity   Order Comments: Your activity upon discharge: activity as tolerated     Order Specific Question Answer Comments   Is discharge order? Yes      Diet   Order Comments: Follow this diet upon discharge: Orders Placed This Encounter      Regular Diet Adult     Order Specific Question Answer Comments   Is discharge order? Yes      Examination     Vital Signs in last 24 hours:      General Appearance:  Alert, cooperative, no distress, appears stated age   Lungs:   CTAB   Cardiovascular:  RRR   Abdomen:   Soft, non-distended, non tender.   Neurologic: Grossly normal         Please see EMR for more detailed significant labs, imaging, consultant notes etc.  Total time spent on discharge: 35 minutes    Ly Pacheco MD  [facility]: Ph:[phone]    CC:Baylee Scherer

## 2021-09-19 NOTE — PLAN OF CARE
Discharge instructions explained.  Pt has on medium formed brown BM.   Copy of AVS given to patient.  Pt agrees to make follow up appt with PCP and GI.  Daughter coming to  patient at 12:30 pm.

## 2021-09-20 ENCOUNTER — PATIENT OUTREACH (OUTPATIENT)
Dept: CARE COORDINATION | Facility: CLINIC | Age: 66
End: 2021-09-20

## 2021-09-20 DIAGNOSIS — Z71.89 OTHER SPECIFIED COUNSELING: ICD-10-CM

## 2021-09-20 NOTE — PROGRESS NOTES
Clinic Care Coordination Contact  [facility]: Post-Discharge Note  SITUATION                                                      Admission:    Admission Date: 09/15/21   Reason for Admission: Abdominal pain  Discharge:   Discharge Date: 09/19/21  Discharge Diagnosis: Abdominal pain    BACKGROUND                                                      Dena Perales is a 66 year old female with past medical history of multiple abdominal surgeries, GERD/PUD, mood disorder presented today for evaluation of abdominal pain, nausea and decreased p.o. intake.  Patient was seen by her primary care last week for the similar complaints and was sent to urgent care for further evaluation with a CT scan.  CT abdomen showed an intrarenal stone but no other acute findings.  Patient went back to see her primary care as a follow-up visit on 9/15.  Patient continued to have symptoms of abdominal pain, nausea and reduced p.o. intake and PCP was concerned for mild dehydration and was sent in for further evaluation to [facility].  Patient continues to report generalized abdominal pain, nausea as well as decreased appetite.  Patient states she has not had anything to eat or drink since her onset of symptoms 5 days ago.  Reports having many nausea but no emesis.  No fever, cough, chest pain, shortness of breath, weakness, tingling, numbness, diarrhea, constipation, dysuria polyuria.  Patient reports having 2 episodes of dark tarry stools 2 days ago.  Patient is not on any chronic NSAIDs or iron supplements.  Patient has had multiple abdominal surgeries including the gastric bypass, ureteral stone, appendectomy and cholecystectomy.     In ER, patient was found to be hemodynamically stable.  Admission labs are unremarkable.  CT abdomen done showed inflammation involving the gastric pouch and proximal Julianne loop suggesting gastritis/enteritis and concerns for pouchitis.  Patient admitted for further evaluation.    ASSESSMENT        "    Discharge Assessment  How are you doing now that you are home?: \"I'm good\"  How are your symptoms? (Red Flag symptoms escalate to triage hotline per guidelines): Improved  Do you feel your condition is stable enough to be safe at home until your provider visit?: Yes  Does the patient have their discharge instructions? : Yes  Does the patient have questions regarding their discharge instructions? : No  Were you started on any new medications or were there changes to any of your previous medications? : No  Does the patient have all of their medications?: No (see comment) (Planning to  today)  Do you have questions regarding any of your medications? : No  Do you have all of your needed medical supplies or equipment (DME)?  (i.e. oxygen tank, CPAP, cane, etc.): Yes  Discharge follow-up appointment scheduled within 14 calendar days? : No  Is patient agreeable to assistance with scheduling? : No    Post-op ([facility] Only)  If the patient had a surgery or procedure, do they have any questions for a nurse?: No         PLAN                                                      Outpatient Plan:    Follow up with PCP in 1 week  Follow up with GI in 2 months for repeat EGD    No future appointments.      For any urgent concerns, please contact our 24 hour nurse triage line: [phone] ([phone])         [facility] Glass  [phone]  [facility]    "

## 2021-09-21 LAB
BACTERIA BLD CULT: NO GROWTH
BACTERIA BLD CULT: NO GROWTH
PATH REPORT.COMMENTS IMP SPEC: NORMAL
PATH REPORT.COMMENTS IMP SPEC: NORMAL
PATH REPORT.FINAL DX SPEC: NORMAL
PATH REPORT.GROSS SPEC: NORMAL
PATH REPORT.MICROSCOPIC SPEC OTHER STN: NORMAL
PATH REPORT.RELEVANT HX SPEC: NORMAL
PHOTO IMAGE: NORMAL

## 2021-09-21 PROCEDURE — 88342 IMHCHEM/IMCYTCHM 1ST ANTB: CPT | Mod: 26 | Performed by: PATHOLOGY

## 2021-09-21 PROCEDURE — 88305 TISSUE EXAM BY PATHOLOGIST: CPT | Mod: 26 | Performed by: PATHOLOGY

## 2022-04-17 ENCOUNTER — HEALTH MAINTENANCE LETTER (OUTPATIENT)
Age: 67
End: 2022-04-17

## 2022-10-29 ENCOUNTER — HEALTH MAINTENANCE LETTER (OUTPATIENT)
Age: 67
End: 2022-10-29

## 2023-04-25 LAB — LAMOTRIGINE SERPL-MCNC: 3.1 UG/ML

## 2023-06-01 ENCOUNTER — HEALTH MAINTENANCE LETTER (OUTPATIENT)
Age: 68
End: 2023-06-01

## 2023-11-11 ENCOUNTER — HEALTH MAINTENANCE LETTER (OUTPATIENT)
Age: 68
End: 2023-11-11

## 2024-06-08 ENCOUNTER — HEALTH MAINTENANCE LETTER (OUTPATIENT)
Age: 69
End: 2024-06-08

## (undated) DEVICE — CONNECTOR ONE-LINK INJECTION SITE LF 7N8399

## (undated) DEVICE — FORCEP BIOPSY DISP 000386

## (undated) DEVICE — CLIP HEMOSTATIC RESOLN DURACLIP DC0235

## (undated) DEVICE — BITE BLOCK SCOPE DISP 20 X 27 000429

## (undated) DEVICE — CATH SUCTION 14FR W/O CTRL DYND41962

## (undated) DEVICE — TUBING SUCTION MEDI-VAC 1/4"X20' N620A - HE

## (undated) DEVICE — PROBE BICAP 7FR BP-7300A

## (undated) DEVICE — KIT SCOPE CLEANING ENDOSCOPY BX00719705

## (undated) DEVICE — TUBING ENDOGATOR + H2O PORT CO

## (undated) DEVICE — PLATE GROUNDING ADULT W/CORD 9165L

## (undated) DEVICE — KIT CONNECTOR FOR OLYMPUS ENDOSCOPES DEFENDO 100310

## (undated) DEVICE — SUCTION CANISTER 1000ML 65651-510

## (undated) DEVICE — KIT IV START DYNDV1431

## (undated) DEVICE — SYR 03ML LL W/O NDL 309657

## (undated) DEVICE — CANNULA NASAL COMFORT SOFT 25FT 0537

## (undated) DEVICE — Device

## (undated) DEVICE — SOL WATER IRRIG 500ML BOTTLE 2F7113

## (undated) DEVICE — SWABCAP DISINFECTANT GREEN CFF10-250